# Patient Record
Sex: MALE | Race: WHITE | NOT HISPANIC OR LATINO | Employment: FULL TIME | URBAN - METROPOLITAN AREA
[De-identification: names, ages, dates, MRNs, and addresses within clinical notes are randomized per-mention and may not be internally consistent; named-entity substitution may affect disease eponyms.]

---

## 2017-03-31 ENCOUNTER — OFFICE VISIT - RIVER FALLS (OUTPATIENT)
Dept: FAMILY MEDICINE | Facility: CLINIC | Age: 52
End: 2017-03-31

## 2017-03-31 ASSESSMENT — MIFFLIN-ST. JEOR: SCORE: 2105.64

## 2017-05-11 ENCOUNTER — OFFICE VISIT - RIVER FALLS (OUTPATIENT)
Dept: FAMILY MEDICINE | Facility: CLINIC | Age: 52
End: 2017-05-11

## 2017-05-11 ENCOUNTER — COMMUNICATION - RIVER FALLS (OUTPATIENT)
Dept: FAMILY MEDICINE | Facility: CLINIC | Age: 52
End: 2017-05-11

## 2017-05-11 LAB
CREAT SERPL-MCNC: 1.22 MG/DL (ref 0.72–1.25)
GLUCOSE BLD-MCNC: 95 MG/DL (ref 65–100)

## 2018-04-03 ENCOUNTER — OFFICE VISIT - RIVER FALLS (OUTPATIENT)
Dept: FAMILY MEDICINE | Facility: CLINIC | Age: 53
End: 2018-04-03

## 2018-04-03 ASSESSMENT — MIFFLIN-ST. JEOR: SCORE: 2152.81

## 2018-04-04 LAB
CREAT SERPL-MCNC: 1.19 MG/DL (ref 0.7–1.33)
GLUCOSE BLD-MCNC: 80 MG/DL (ref 65–99)

## 2018-11-21 ENCOUNTER — OFFICE VISIT - RIVER FALLS (OUTPATIENT)
Dept: FAMILY MEDICINE | Facility: CLINIC | Age: 53
End: 2018-11-21

## 2019-04-16 ENCOUNTER — OFFICE VISIT - RIVER FALLS (OUTPATIENT)
Dept: FAMILY MEDICINE | Facility: CLINIC | Age: 54
End: 2019-04-16

## 2019-04-16 ASSESSMENT — MIFFLIN-ST. JEOR: SCORE: 2163.7

## 2019-12-12 ENCOUNTER — OFFICE VISIT - RIVER FALLS (OUTPATIENT)
Dept: FAMILY MEDICINE | Facility: CLINIC | Age: 54
End: 2019-12-12

## 2019-12-12 ASSESSMENT — MIFFLIN-ST. JEOR: SCORE: 2166.42

## 2019-12-18 ENCOUNTER — OFFICE VISIT - RIVER FALLS (OUTPATIENT)
Dept: FAMILY MEDICINE | Facility: CLINIC | Age: 54
End: 2019-12-18

## 2019-12-18 LAB
ERYTHROCYTE [DISTWIDTH] IN BLOOD BY AUTOMATED COUNT: 13.8 % (ref 11.5–15.5)
HCT VFR BLD AUTO: 40.5 % (ref 37–53)
HGB BLD-MCNC: 13.4 G/DL (ref 13.5–17.5)
MCH RBC QN AUTO: 29.5 PG (ref 26–34)
MCHC RBC AUTO-ENTMCNC: 33.1 G/DL (ref 32–36)
MCV RBC AUTO: 89 FL (ref 80–100)
PLATELET # BLD AUTO: 299 10*3/UL (ref 140–440)
PMV BLD: 10.7 FL (ref 6.5–11)
RBC # BLD AUTO: 4.54 10*6/UL (ref 4.3–5.9)
WBC # BLD AUTO: 9.7 10*3/UL (ref 4.5–11)

## 2019-12-18 ASSESSMENT — MIFFLIN-ST. JEOR: SCORE: 2166.42

## 2019-12-19 ENCOUNTER — COMMUNICATION - RIVER FALLS (OUTPATIENT)
Dept: FAMILY MEDICINE | Facility: CLINIC | Age: 54
End: 2019-12-19

## 2020-02-24 ENCOUNTER — OFFICE VISIT - RIVER FALLS (OUTPATIENT)
Dept: FAMILY MEDICINE | Facility: CLINIC | Age: 55
End: 2020-02-24

## 2020-02-24 LAB
D DIMER PPP FEU-MCNC: 0.91
D DIMER PPP FEU-MCNC: NORMAL

## 2020-02-24 ASSESSMENT — MIFFLIN-ST. JEOR: SCORE: 2184.56

## 2020-06-02 ENCOUNTER — AMBULATORY - RIVER FALLS (OUTPATIENT)
Dept: FAMILY MEDICINE | Facility: CLINIC | Age: 55
End: 2020-06-02

## 2020-06-02 ASSESSMENT — MIFFLIN-ST. JEOR: SCORE: 2155.53

## 2020-06-03 ENCOUNTER — COMMUNICATION - RIVER FALLS (OUTPATIENT)
Dept: FAMILY MEDICINE | Facility: CLINIC | Age: 55
End: 2020-06-03

## 2020-06-03 LAB
BUN SERPL-MCNC: 25 MG/DL (ref 7–25)
BUN/CREAT RATIO - HISTORICAL: 14 (ref 6–22)
CALCIUM SERPL-MCNC: 9.6 MG/DL (ref 8.6–10.3)
CHLORIDE BLD-SCNC: 110 MMOL/L (ref 98–110)
CO2 SERPL-SCNC: 25 MMOL/L (ref 20–32)
CREAT SERPL-MCNC: 1.82 MG/DL (ref 0.7–1.33)
EGFRCR SERPLBLD CKD-EPI 2021: 41 ML/MIN/1.73M2
ERYTHROCYTE [DISTWIDTH] IN BLOOD BY AUTOMATED COUNT: 13.1 % (ref 11–15)
GLUCOSE BLD-MCNC: 76 MG/DL (ref 65–99)
HCT VFR BLD AUTO: 36.8 % (ref 38.5–50)
HGB BLD-MCNC: 12.5 GM/DL (ref 13.2–17.1)
MCH RBC QN AUTO: 29.6 PG (ref 27–33)
MCHC RBC AUTO-ENTMCNC: 34 GM/DL (ref 32–36)
MCV RBC AUTO: 87 FL (ref 80–100)
PLATELET # BLD AUTO: 242 10*3/UL (ref 140–400)
PMV BLD: 10.4 FL (ref 7.5–12.5)
POTASSIUM BLD-SCNC: 4.8 MMOL/L (ref 3.5–5.3)
RBC # BLD AUTO: 4.23 10*6/UL (ref 4.2–5.8)
SODIUM SERPL-SCNC: 141 MMOL/L (ref 135–146)
WBC # BLD AUTO: 5.9 10*3/UL (ref 3.8–10.8)

## 2020-06-09 ENCOUNTER — OFFICE VISIT - RIVER FALLS (OUTPATIENT)
Dept: FAMILY MEDICINE | Facility: CLINIC | Age: 55
End: 2020-06-09

## 2020-06-23 ENCOUNTER — AMBULATORY - RIVER FALLS (OUTPATIENT)
Dept: FAMILY MEDICINE | Facility: CLINIC | Age: 55
End: 2020-06-23

## 2020-06-23 ASSESSMENT — MIFFLIN-ST. JEOR: SCORE: 2155.53

## 2020-06-24 ENCOUNTER — COMMUNICATION - RIVER FALLS (OUTPATIENT)
Dept: FAMILY MEDICINE | Facility: CLINIC | Age: 55
End: 2020-06-24

## 2020-06-24 LAB
BUN SERPL-MCNC: 25 MG/DL (ref 7–25)
BUN/CREAT RATIO - HISTORICAL: NORMAL (ref 6–22)
CALCIUM SERPL-MCNC: 9.9 MG/DL (ref 8.6–10.3)
CHLORIDE BLD-SCNC: 105 MMOL/L (ref 98–110)
CO2 SERPL-SCNC: 26 MMOL/L (ref 20–32)
CREAT SERPL-MCNC: 1.31 MG/DL (ref 0.7–1.33)
EGFRCR SERPLBLD CKD-EPI 2021: 61 ML/MIN/1.73M2
GLUCOSE BLD-MCNC: 81 MG/DL (ref 65–99)
POTASSIUM BLD-SCNC: 4.5 MMOL/L (ref 3.5–5.3)
SODIUM SERPL-SCNC: 139 MMOL/L (ref 135–146)

## 2020-10-27 ENCOUNTER — COMMUNICATION - RIVER FALLS (OUTPATIENT)
Dept: FAMILY MEDICINE | Facility: CLINIC | Age: 55
End: 2020-10-27

## 2020-10-27 ENCOUNTER — OFFICE VISIT - RIVER FALLS (OUTPATIENT)
Dept: FAMILY MEDICINE | Facility: CLINIC | Age: 55
End: 2020-10-27

## 2020-11-04 ENCOUNTER — OFFICE VISIT - RIVER FALLS (OUTPATIENT)
Dept: FAMILY MEDICINE | Facility: CLINIC | Age: 55
End: 2020-11-04

## 2020-11-04 ENCOUNTER — COMMUNICATION - RIVER FALLS (OUTPATIENT)
Dept: FAMILY MEDICINE | Facility: CLINIC | Age: 55
End: 2020-11-04

## 2020-11-04 ASSESSMENT — MIFFLIN-ST. JEOR: SCORE: 2148.28

## 2021-04-26 ENCOUNTER — OFFICE VISIT - RIVER FALLS (OUTPATIENT)
Dept: FAMILY MEDICINE | Facility: CLINIC | Age: 56
End: 2021-04-26

## 2021-04-26 LAB — URATE SERPL-MCNC: 9.2 MG/DL (ref 4–8)

## 2021-04-26 ASSESSMENT — MIFFLIN-ST. JEOR: SCORE: 2209.51

## 2021-04-28 LAB
A/G RATIO - HISTORICAL: 1.4 (ref 1–2.5)
ALBUMIN SERPL-MCNC: 4.2 GM/DL (ref 3.6–5.1)
ALP SERPL-CCNC: 73 UNIT/L (ref 35–144)
ALT SERPL W P-5'-P-CCNC: 35 UNIT/L (ref 9–46)
ANA SER QL IA: NEGATIVE
AST SERPL W P-5'-P-CCNC: 26 UNIT/L (ref 10–35)
BILIRUB DIRECT SERPL-MCNC: 0.1 MG/DL
BILIRUB INDIRECT SERPL-MCNC: 0.6 MG/DL (ref 0.2–1.2)
BILIRUB SERPL-MCNC: 0.7 MG/DL (ref 0.2–1.2)
BUN SERPL-MCNC: 16 MG/DL (ref 7–25)
BUN/CREAT RATIO - HISTORICAL: NORMAL (ref 6–22)
C REACTIVE PROTEIN LHE: 16 MG/L
CALCIUM SERPL-MCNC: 9.6 MG/DL (ref 8.6–10.3)
CHLORIDE BLD-SCNC: 104 MMOL/L (ref 98–110)
CO2 SERPL-SCNC: 25 MMOL/L (ref 20–32)
CREAT SERPL-MCNC: 1.13 MG/DL (ref 0.7–1.33)
CYCLIC CITRULLINATED PEPTIDE CCP AB IGG - QUEST: <16
EGFRCR SERPLBLD CKD-EPI 2021: 73 ML/MIN/1.73M2
ERYTHROCYTE [DISTWIDTH] IN BLOOD BY AUTOMATED COUNT: 13.3 % (ref 11–15)
ERYTHROCYTE [SEDIMENTATION RATE] IN BLOOD BY WESTERGREN METHOD: 19 MM/H
GLOBULIN: 3.1 (ref 1.9–3.7)
GLUCOSE BLD-MCNC: 90 MG/DL (ref 65–99)
HCT VFR BLD AUTO: 38.5 % (ref 38.5–50)
HGB BLD-MCNC: 13.2 GM/DL (ref 13.2–17.1)
MCH RBC QN AUTO: 29.8 PG (ref 27–33)
MCHC RBC AUTO-ENTMCNC: 34.3 GM/DL (ref 32–36)
MCV RBC AUTO: 86.9 FL (ref 80–100)
PLATELET # BLD AUTO: 245 10*3/UL (ref 140–400)
PMV BLD: 10.6 FL (ref 7.5–12.5)
POTASSIUM BLD-SCNC: 4.8 MMOL/L (ref 3.5–5.3)
PROT SERPL-MCNC: 7.3 GM/DL (ref 6.1–8.1)
RBC # BLD AUTO: 4.43 10*6/UL (ref 4.2–5.8)
RHEUMATOID FACT SER NEPH-ACNC: <14 IU/ML
SODIUM SERPL-SCNC: 138 MMOL/L (ref 135–146)
TSH SERPL DL<=0.005 MIU/L-ACNC: 1.85 MIU/L (ref 0.4–4.5)
WBC # BLD AUTO: 5.5 10*3/UL (ref 3.8–10.8)

## 2021-04-29 ENCOUNTER — COMMUNICATION - RIVER FALLS (OUTPATIENT)
Dept: FAMILY MEDICINE | Facility: CLINIC | Age: 56
End: 2021-04-29

## 2022-02-12 VITALS
TEMPERATURE: 97.3 F | WEIGHT: 289.6 LBS | DIASTOLIC BLOOD PRESSURE: 62 MMHG | HEART RATE: 80 BPM | SYSTOLIC BLOOD PRESSURE: 112 MMHG | BODY MASS INDEX: 39.83 KG/M2

## 2022-02-12 VITALS
HEART RATE: 72 BPM | RESPIRATION RATE: 14 BRPM | OXYGEN SATURATION: 97 % | TEMPERATURE: 97.3 F | BODY MASS INDEX: 40.29 KG/M2 | WEIGHT: 297.5 LBS | SYSTOLIC BLOOD PRESSURE: 122 MMHG | HEIGHT: 72 IN | DIASTOLIC BLOOD PRESSURE: 78 MMHG

## 2022-02-12 VITALS
BODY MASS INDEX: 39.01 KG/M2 | TEMPERATURE: 97.8 F | BODY MASS INDEX: 39.01 KG/M2 | SYSTOLIC BLOOD PRESSURE: 120 MMHG | WEIGHT: 288 LBS | DIASTOLIC BLOOD PRESSURE: 80 MMHG | HEIGHT: 72 IN | DIASTOLIC BLOOD PRESSURE: 70 MMHG | HEART RATE: 74 BPM | HEIGHT: 72 IN | HEART RATE: 80 BPM | SYSTOLIC BLOOD PRESSURE: 126 MMHG | OXYGEN SATURATION: 98 % | WEIGHT: 288 LBS

## 2022-02-12 VITALS
SYSTOLIC BLOOD PRESSURE: 116 MMHG | DIASTOLIC BLOOD PRESSURE: 78 MMHG | SYSTOLIC BLOOD PRESSURE: 128 MMHG | WEIGHT: 274.6 LBS | HEART RATE: 76 BPM | TEMPERATURE: 97.6 F | HEART RATE: 73 BPM | TEMPERATURE: 99 F | HEIGHT: 72 IN | BODY MASS INDEX: 37.19 KG/M2 | DIASTOLIC BLOOD PRESSURE: 85 MMHG

## 2022-02-12 VITALS
HEART RATE: 64 BPM | DIASTOLIC BLOOD PRESSURE: 68 MMHG | WEIGHT: 284 LBS | TEMPERATURE: 97.5 F | RESPIRATION RATE: 16 BRPM | BODY MASS INDEX: 39.28 KG/M2 | BODY MASS INDEX: 38.47 KG/M2 | HEIGHT: 72 IN | SYSTOLIC BLOOD PRESSURE: 118 MMHG | HEIGHT: 72 IN

## 2022-02-12 VITALS
BODY MASS INDEX: 39.55 KG/M2 | HEIGHT: 72 IN | OXYGEN SATURATION: 94 % | DIASTOLIC BLOOD PRESSURE: 70 MMHG | HEART RATE: 83 BPM | SYSTOLIC BLOOD PRESSURE: 118 MMHG | WEIGHT: 292 LBS

## 2022-02-12 VITALS
OXYGEN SATURATION: 95 % | HEIGHT: 72 IN | HEIGHT: 72 IN | WEIGHT: 285.6 LBS | HEART RATE: 74 BPM | DIASTOLIC BLOOD PRESSURE: 71 MMHG | SYSTOLIC BLOOD PRESSURE: 115 MMHG | HEIGHT: 72 IN | DIASTOLIC BLOOD PRESSURE: 70 MMHG | HEART RATE: 73 BPM | BODY MASS INDEX: 38.68 KG/M2 | OXYGEN SATURATION: 96 % | WEIGHT: 285.6 LBS | BODY MASS INDEX: 39.28 KG/M2 | SYSTOLIC BLOOD PRESSURE: 115 MMHG | BODY MASS INDEX: 38.68 KG/M2

## 2022-02-12 VITALS
BODY MASS INDEX: 38.6 KG/M2 | HEART RATE: 84 BPM | WEIGHT: 285 LBS | DIASTOLIC BLOOD PRESSURE: 84 MMHG | SYSTOLIC BLOOD PRESSURE: 136 MMHG | TEMPERATURE: 98.1 F | HEIGHT: 72 IN

## 2022-02-12 VITALS
HEART RATE: 58 BPM | TEMPERATURE: 98.3 F | DIASTOLIC BLOOD PRESSURE: 75 MMHG | SYSTOLIC BLOOD PRESSURE: 132 MMHG | BODY MASS INDEX: 38.93 KG/M2 | WEIGHT: 287.4 LBS | HEIGHT: 72 IN

## 2022-02-16 NOTE — TELEPHONE ENCOUNTER
---------------------  From: Benoit MESSER, Gabrielle ENGLAND (Phone Messages Pool ([id number]_[facility]))   To: DIONTE CUNNINGHAM    Cc: [facility] ([id number]);      Sent: 11/4/2020 11:13:37 AM CST  Subject: RE: General Message     Dr. Zahra Rodriguez in out of the clinic this week, but we do have other providers available for you to have an appointment with.    I will have someone get in touch with you about setting up an appointment.    Thank you,    Gabrielle Laboy RN      ---------------------  From: DIONTE CUNNINGHAM  To: [facility]  Sent: 11/04/2020 11:06 a.m. CST  Subject: RE: General Message  ok thanks My Main Doctor is mukund Cardozo (spelling). Would he be able to give prescription I have seen him multiple times for gout. If not could I get into to see him or call him.  ---------------------  From: Becca Hernandez LPN (Phone Messages Pool ([id number]_[facility]))  To: DIONTE CUNNINGHAM  Sent: 11/4/2020 9:35:01 AM CST  Subject: General Message       Ede Strickland is out of clinic until 11/9. The covering provider said you would need a visit for your gout pain.       Thanks,  Becca MCKAY. NLVM [email or web address]

## 2022-02-16 NOTE — TELEPHONE ENCOUNTER
US no DVT but stranding in popliteal vein.  He is clinically doing better today. Will have him elevate the legs and continue his ASA. FU if any further symptoms. Planning trip to [place] soon. Advised getting up and moving around on the flight.    KAH

## 2022-02-16 NOTE — RESULTS
-------------------------------- Original Report -------------------------------    EXAM: MRI of the RIGHT KNEE, without contrast    CLINICAL INFORMATION: Male, 54 years old, with right knee outside pain for 3  days after injury.  INDICATION: Knee pain    PRIOR SURGERY: History of meniscus repair, and sure which knee.  PLAIN FILMS: None available.  COMPARISONS: No prior MRIs available.    TECHNICAL INFORMATION: Using a 3.0T MR scanner and a localizing surface coil:    sagittals:  PD, PDFS    coronals:  PD, T2FS    axials:  PD, PDFS  SEDATION: None  CONTRAST: None    FINDINGS:    Knee joint:     Effusion: Moderate sized right knee effusion.    Popliteal cyst: None.    Loose bodies: None.    Subcutaneous and extra-articular soft tissues: Prominent appearance of edema  signal along the lateral aspect of the knee.      Ligaments:     ACL: Intact ACL anteromedial and posterolateral bundles, without sprain or  tear.    PCL: Intact PCL, without acute or chronic injury.    MCL: Intact MCL superficial and deep layers, without injury.    LCL: The proximal LCL at the femoral epicondylar attachment is thickened,  edematous, and irregular (axial series 4 image 13).  The distal attachment is  intact.  Prominent surrounding soft tissue edema.     Posterolateral corner:    Moderate posterolateral corner soft tissue edema.  The popliteus tendon is  intact though appears thickened and edematous (axial series 4 image 14).  The  muscle belly is unremarkable in appearance.  Mild intramuscular edema within the  lateral head of the gastric without evidence for architectural distortion.  The  iliotibial band is normal in thickness and signal intensity, with prominent  appearance of fluid/edema interposed between the band and the lateral femoral  epicondyle.  The biceps femoris, and popliteofibular ligament are intact.     Posteromedial corner:     No posteromedial corner soft tissue injury.  Semimembranosus, pes anserine  tendons and  posterior oblique ligament are without injury, tendinopathy or  bursitis.     Extensor mechanism:    Patellar tendon: Intact, without tendinopathy.    Quadriceps tendon: Intact, without tendinopathy.    Retinacula: Medial and lateral retinacula are intact.    Fat pads: Unremarkable infrapatellar Hoffa's, quadriceps and prefemoral fat  pads.     Medial compartment:    Medial meniscus: No articular surface, meniscosynovial junction or root tear.   No displacement, extrusion or parameniscal cyst.    Medial femoral condyle: No chondromalacia or osteochondral abnormality.    Medial tibial plateau: No chondromalacia or osteochondral abnormality.     Lateral compartment:     Lateral meniscus: Lateral meniscus is abnormal in appearance with prominently  diminutive appearance of the body and posterior horn suggestive for residual  extensive partial meniscectomy.  The posterior root and meniscofemoral ligaments  appear intact.  No evidence of lateral meniscus tearing.    Lateral femoral condyle: No chondromalacia or osteochondral abnormality.    Lateral tibial plateau: No chondromalacia or osteochondral abnormality.     Patellofemoral joint:     Patella: Grade II chondromalacia with full-thickness fissuring along the mid  and inferior central median ridge of the patella (axial series 3 image 8).    Trochlea: Grade 2 signal heterogeneity of the central trochlea (sagittal  series 6 image 16).  Grade III chondromalacia of the peripheral lateral trochlea  with focus of osseous reactive edema.     Proximal tibiofibular joint:    Unremarkable, without evidence of ligament sprain injury, joint effusion or  adjacent marrow edema.     Bones:     No stress/occult fractures or other marrow edema/pathology.    IMPRESSION:  1. Grade 1 strain injury of the proximal lateral collateral ligament at the  femoral epicondyle.  Moderate associated soft tissue edema  2. Moderate posterolateral corner soft tissue edema with mild sprain injury  of  the popliteus tendon and lateral gastrocnemius muscle belly.  No high-grade  tendon tearing or muscle architectural distortion.  3. Prominent edema/fluid signal associated with the distal iliotibial band, both  deep and superficial, suggestive for IT band syndrome versus mild IT band  sprain.    4. Findings suggestive for residua of lateral meniscectomy with prominently  diminutive appearance of the body and posterior horn.  No evidence for medial or  lateral meniscal tearing.  5. Patellofemoral chondromalacia with grade 2 and grade 3 changes as above.  6. Medial and lateral compartment articular cartilage is preserved.  7. PCL, MCL, and LCL are intact.  8. Moderate knee joint effusion.      KME      Read by: Ayaka Ramos M.D.  Reviewed and Electronically Signed by: Ayaka Ramos M.D.

## 2022-02-16 NOTE — NURSING NOTE
Comprehensive Intake Entered On:  10/27/2020 8:28 AM CDT    Performed On:  10/27/2020 8:26 AM CDT by Sissy Pierce CMA               Summary   Chief Complaint :   c/o Right Knee swollen, painful and not getting better; twisted 4 days ago; verbal consent given video visit   Menstrual Status :   N/A   Height Measured :   71.5 in(Converted to: 5 ft 11 in, 181.61 cm)    Sissy Pierce CMA - 10/27/2020 8:26 AM CDT   Health Status   Allergies Verified? :   Yes   Medication History Verified? :   Yes   Immunizations Current :   Yes   Medical History Verified? :   Yes   Sissy Pierce CMA - 10/27/2020 8:26 AM CDT   Consents   Consent for Immunization Exchange :   Consent Granted   Consent for Immunizations to Providers :   Consent Granted   Sissy Pierce CMA - 10/27/2020 8:26 AM CDT   Meds / Allergies   (As Of: 10/27/2020 8:28:52 AM CDT)   Allergies (Active)   Allegra-D 12 Hour  Estimated Onset Date:   Unspecified ; Created By:   Hanna Kothari; Reaction Status:   Active ; Category:   Drug ; Substance:   Allegra-D 12 Hour ; Type:   Allergy ; Updated By:   Hanna Kothari; Reviewed Date:   10/27/2020 8:26 AM CDT      penicillin V potassium  Estimated Onset Date:   Unspecified ; Reactions:   unknown ; Created By:   Meche Rodríguez; Reaction Status:   Active ; Category:   Drug ; Substance:   penicillin V potassium ; Type:   Allergy ; Updated By:   Meche Rodríguez; Reviewed Date:   10/27/2020 8:26 AM CDT        Medication List   (As Of: 10/27/2020 8:28:52 AM CDT)   Prescription/Discharge Order    allopurinol  :   allopurinol ; Status:   Prescribed ; Ordered As Mnemonic:   allopurinol 300 mg oral tablet ; Simple Display Line:   300 mg, 1 tab(s), Oral, daily, 90 tab(s), 3 Refill(s) ; Ordering Provider:   Lui Sweeney MD; Catalog Code:   allopurinol ; Order Dt/Tm:   6/4/2020 8:15:19 AM CDT          aspirin  :   aspirin ; Status:   Prescribed ; Ordered As Mnemonic:   aspirin 325 mg oral delayed release tablet ; Simple Display  Line:   325 mg, 1 tab(s), PO, Daily, 30 tab(s), 0 Refill(s) ; Ordering Provider:   Lui Sweeney MD; Catalog Code:   aspirin ; Order Dt/Tm:   3/31/2017 2:48:13 PM CDT          colchicine  :   colchicine ; Status:   Prescribed ; Ordered As Mnemonic:   Colcrys 0.6 mg oral tablet ; Simple Display Line:   0.6 mg, 1 tab(s), Oral, bid, for 7 day(s), 14 tab(s), 1 Refill(s) ; Ordering Provider:   Lui Sweeney MD; Catalog Code:   colchicine ; Order Dt/Tm:   6/9/2020 1:54:52 PM CDT          FLUoxetine  :   FLUoxetine ; Status:   Prescribed ; Ordered As Mnemonic:   FLUoxetine 20 mg oral capsule ; Simple Display Line:   1 cap(s), Oral, daily, 90 cap(s), 3 Refill(s) ; Ordering Provider:   Lui Sweeney MD; Catalog Code:   FLUoxetine ; Order Dt/Tm:   6/4/2020 8:15:21 AM CDT          lisinopril  :   lisinopril ; Status:   Prescribed ; Ordered As Mnemonic:   lisinopril 20 mg oral tablet ; Simple Display Line:   1 tab(s), Oral, daily, 90 tab(s), 3 Refill(s) ; Ordering Provider:   Lui Sweeney MD; Catalog Code:   lisinopril ; Order Dt/Tm:   6/4/2020 8:15:20 AM CDT          Miscellaneous Rx Supply  :   Miscellaneous Rx Supply ; Status:   Prescribed ; Ordered As Mnemonic:   Knee high compression stockings ; Simple Display Line:   2 pair, top, daily, dispense 2 Pair, 2 EA, 2 Refill(s) ; Ordering Provider:   Lui Sweeney MD; Catalog Code:   Miscellaneous Rx Supply ; Order Dt/Tm:   4/3/2018 3:42:44 PM CDT            Home Meds    Miscellaneous Rx Supply  :   Miscellaneous Rx Supply ; Status:   Documented ; Ordered As Mnemonic:   C-Pap machine @ noc. ; Catalog Code:   Miscellaneous Rx Supply ; Order Dt/Tm:   6/22/2012 11:09:38 AM CDT            ID Risk Screen   Recent Travel History :   No recent travel   Family Member Travel History :   No recent travel   Other Exposure to Infectious Disease :   Unknown   Sissy Pierce CMA - 10/27/2020 8:26 AM CDT

## 2022-02-16 NOTE — NURSING NOTE
CAGE Assessment Entered On:  4/16/2019 3:49 PM CDT    Performed On:  4/16/2019 3:49 PM CDT by Isatu Singletary CMA               Assessment   Have you ever felt you should cut down on your drinking :   No   Have people annoyed you by criticizing your drinking :   No   Have you ever felt bad or guilty about your drinking :   No   Have you ever taken a drink first thing in the morning to steady your nerves or get rid of a hangover (Eye-opener) :   No   CAGE Score :   0    Isatu Singletary CMA - 4/16/2019 3:49 PM CDT

## 2022-02-16 NOTE — PROGRESS NOTES
Patient:   DIONTE CUNNINGHAM            MRN: [id number]            FIN: [id number]               Age:   52 years     Sex:  Male     :  1965   Associated Diagnoses:   Acute gout of left foot   Author:   Ede Rowland PA-C      Report Summary   Diagnosis  Acute gout of left foot ([id number] M10.9).  Patient InstructionsOrders   Visit Information   Visit type:  General concerns.    Accompanied by:  No one.    Source of history:  Self.    Referral source:  Self.    History limitation:  None.       Chief Complaint   2018 3:10 PM CST   Left foot and toe- possible gout x 3-4 days        Interval History   Gout   The course is worsening.  Associated symptoms characterized by joint redness and joint warmth.  Left first MTP. Has flares from time to time. Ran out of Meloxicam. No injury. Needs new RX. CC above noted and confirmed with the patient.Needs new order for colonoscopy..        Review of Systems   Constitutional:  Negative.    Musculoskeletal:  Negative except as documented in history of present illness.    Integumentary:  Negative except as documented in history of present illness.       Health Status   Allergies:    Allergic Reactions (All)  Severity Not Documented  Allegra-D 12 Hour (No reactions were documented)  Penicillin V potassium (Unknown)   Medications:  (Selected)   Prescriptions  Prescribed  FLUoxetine 20 mg oral capsule: 1 cap(s) ( 20 mg ), PO, Daily, # 90 cap(s), 3 Refill(s), Type: Maintenance, Pharmacy: [facility] #[id number], 1 cap(s) po daily  Knee high compression stockings: Knee high compression stockings, 2 pair, top, daily, Instructions: dispense 2 Pair, Supply, # 2 EA, 2 Refill(s), Type: Maintenance  aspirin 325 mg oral delayed release tablet: 1 tab(s) ( 325 mg ), PO, Daily, # 30 tab(s), 0 Refill(s), Type: Maintenance, OTC (Rx)  lisinopril 20 mg oral tablet: 1 tab(s) ( 20 mg ), PO, Daily, # 90 tab(s), 3 Refill(s), Type: Maintenance, Pharmacy: [facility] #[id number], 1 tab(s) po  daily  meloxicam 15 mg oral tablet: = 1 tab(s) ( 15 mg ), PO, Daily, # 90 tab(s), 0 Refill(s), Type: Maintenance, Pharmacy: [facility] #[id number], 1 tab(s) Oral daily  predniSONE 20 mg oral tablet: = 1 tab(s) ( 20 mg ), PO, Daily, # 7 tab(s), 0 Refill(s), Type: Maintenance, Pharmacy: [facility] #[id number], 1 tab(s) Oral daily,x7 day(s)  Documented Medications  Documented  C-Pap machine @ noc.: C-Pap machine @ noc., 0 Refill(s), Type: Soft Stop   Problem list:    All Problems (Selected)  LISBETH (Obstructive Sleep Apnea) / ICD-9-.23 / Confirmed  Obese / ICD-9-.00 / Probable  Hypertension / SNOMED CT 6705031579 / Confirmed  History of DVT in adulthood / SNOMED CT 9627349132 / Confirmed  Family history of kidney stone / SNOMED CT 0424437811 / Confirmed  Depression, Recurrent / SNOMED CT 706409363 / Confirmed      Histories   Past Medical History:    Active  Depression, Recurrent (744562686)  Obese (278.00)  Family history of kidney stone (8030217727)   Family History:    CA - Lung cancer  Mother  CVA - Cerebrovascular accident  Mother     Procedure history:    Bone spur ([id number]) on 8/8/2016 at 50 Years.  Comments:  8/16/2016 9:28 AM - Sheridan Ignacio  Left hallux.  Vasectomy (39042677) on 12/16/2005 at 39 Years.  Ventricular septal defect, repaired ([id number]).      Physical Examination   Vital Signs   11/21/2018 3:10 PM CST Temperature Tympanic 97.3 DegF  LOW    Peripheral Pulse Rate 80 bpm    Pulse Site Radial artery    HR Method Manual    Systolic Blood Pressure 112 mmHg    Diastolic Blood Pressure 62 mmHg    Mean Arterial Pressure 79 mmHg    BP Site Left arm    BP Method Manual      Measurements from flowsheet : Measurements   11/21/2018 3:10 PM CST   Weight Measured - Standard                289.6 lb     Respiratory:  Respirations are non-labored.    Cardiovascular:  Normal rate.    Musculoskeletal:  Left first MTP laterally is swollen, erythematous and warm to touch. Pedal  pulse intact. Skin intact..       Impression and Plan   Diagnosis     Acute gout of left foot ([id number] M10.9).     Patient Instructions:       Counseled: Patient, Regarding diagnosis, Activity, Verbalized understanding.    Orders     Orders (Selected)   Prescriptions  Prescribed  meloxicam 15 mg oral tablet: = 1 tab(s) ( 15 mg ), PO, Daily, # 90 tab(s), 0 Refill(s), Type: Maintenance, Pharmacy: [facility] #[id number], 1 tab(s) Oral daily  predniSONE 20 mg oral tablet: = 1 tab(s) ( 20 mg ), PO, Daily, # 7 tab(s), 0 Refill(s), Type: Maintenance, Pharmacy: [facility] #[id number], 1 tab(s) Oral daily,x7 day(s).     Sent order for colonoscopy. RONNY PRN.

## 2022-02-16 NOTE — TELEPHONE ENCOUNTER
Entered by Shayla Jamison CMA on April 29, 2020 10:11:25 AM CDT  ---------------------  From: Shayla Jamison CMA   To: [employer] [facility] #[id number]    Sent: 4/29/2020 10:11:25 AM CDT  Subject: Medication Management     ** Submitted: **  Order:lisinopril (lisinopril 20 mg oral tablet)  1 tab(s)  Oral  daily  Qty:  30 tab(s)        Refills:  0          Substitutions Allowed     Route To [facility] #[id number]    Signed by Shayla Jamison CMA  4/29/2020 3:10:00 PM    ** Submitted: **  Complete:lisinopril (lisinopril 20 mg oral tablet)   Signed by Shayla Jamison CMA  4/29/2020 3:11:00 PM    ** Not Approved:  **  lisinopril (LISINOPRIL 20MG TABLETS)  TAKE 1 TABLET BY MOUTH DAILY  Qty:  90 tab(s)        Days Supply:  30        Refills:  0          Substitutions Allowed     Route To [facility] #[id number]   Signed by Shayla Jamison CMA            ** Submitted: **  Order:FLUoxetine (FLUoxetine 20 mg oral capsule)  1 cap(s)  Oral  daily  Qty:  30 cap(s)        Refills:  0          Substitutions Allowed     Route To [facility] #[id number]    Signed by Shayla Jamison CMA  4/29/2020 3:10:00 PM    ** Submitted: **  Complete:FLUoxetine (FLUoxetine 20 mg oral capsule)   Signed by Shayla Jamison CMA  4/29/2020 3:10:00 PM    ** Not Approved:  **  FLUoxetine (FLUOXETINE 20MG CAPSULES)  TAKE ONE CAPSULE BY MOUTH DAILY  Qty:  90 cap(s)        Days Supply:  30        Refills:  0          Substitutions Allowed     Route To [facility] #[id number]   Signed by Shayla Jamison CMA            ------------------------------------------  [id number]  To: Lui Sweeney MD  Sent: April 29, 2020 7:23:09 AM CDT  Subject: Medication Management  Due: April 30, 2020 7:23:09 AM CDT    ** On Hold Pending Signature **  Drug: lisinopril (lisinopril 20 mg oral tablet)  TAKE 1 TABLET BY MOUTH DAILY  Quantity: 90 tab(s)  Days Supply: 30  Refills: 0  Substitutions  Allowed  Notes from Pharmacy:     Dispensed Drug: lisinopril (lisinopril 20 mg oral tablet)  TAKE 1 TABLET BY MOUTH DAILY  Quantity: 90 tab(s)  Days Supply: 30  Refills: 0  Substitutions Allowed  Notes from Pharmacy:     ** On Hold Pending Signature **  Drug: FLUoxetine (FLUoxetine 20 mg oral capsule)  TAKE ONE CAPSULE BY MOUTH DAILY  Quantity: 90 cap(s)  Days Supply: 30  Refills: 0  Substitutions Allowed  Notes from Pharmacy:     Dispensed Drug: FLUoxetine (FLUoxetine 20 mg oral capsule)  TAKE ONE CAPSULE BY MOUTH DAILY  Quantity: 90 cap(s)  Days Supply: 30  Refills: 0  Substitutions Allowed  Notes from Pharmacy:   ------------------------------------------Called and left a detailed message, patient is due for labs and med check with [facility], refilled for 30 days per protocol.   Shayla DA SILVA CMA

## 2022-02-16 NOTE — PROGRESS NOTES
Chief Complaint    Allergy since last thursday. Taken Benydrl and Claritin.  History of Present Illness       Patient here because of a rash on his cheeks forehead and around his eyes.       Is been there for 4 days now.  He thinks today is better than yesterday.  Does feel warm flushed.  Says even gets enough heat wears glasses of stand up.  It is uncomfortable but not really painful.  He has taken some Claritin and Benadryl over the weekend that is possibly helping but only a small amount.  There is no rash anywhere else in his body.  He did have a recent right great toe gout flare that has not resolved but is much better.  Otherwise his body feels good.  Uses CPAP and has not used any different mask or had any different washes to his CPAP mask.  He has had depression does request that I refill his fluoxetine says it works very well.       He is used lisinopril for his hypertension.  He has had colchicine available for gout flares.  Been recommended he try allopurinol but he did never filled that medication  Review of Systems       Insert follow-up review of symptoms  Physical Exam   Vitals & Measurements    T: 97.3  F (Tympanic)  HR: 72 (Peripheral)  RR: 14  BP: 122/78  SpO2: 97%     HT: 71.5 in  WT: 297.5 lb  BMI: 40.91        White sclera       Malar rash present he does have photos that he will keep       Oropharynx without any lesions       Supple neck no adenopathy       Lungs are clear to auscultation       No joint swelling in the hands elbows       Abdomen is not distended  Assessment/Plan       1. LISBETH (Obstructive Sleep Apnea) (G47.33)          We will continue to use his CPAP and ensure the mask is clean as it sits on his face         Ordered:          Uric Acid* ([facility]), Specimen Type: Serum, Collection Date: 04/26/21 11:08:00 CDT                2. Gout (M10.9)          Reduce prednisone which will help his gout but also possibly the rash.  I have ordered a uric acid he is still wanting to periodically  treat his gout not on an everyday basis         Ordered:          predniSONE, See Instructions, Instructions: 2 tab(s) PO Daily 7 day(s) then 1 tab po daily for next 7 days, # 21 tab(s), 0 Refill(s), Type: Maintenance, Pharmacy: [facility] #[id number], 2 tab(s) PO Daily 7 day(s) then 1 tab po daily for next 7 days, 71.5, i..., (Ordered)          JACQUIE Screen, IFA, with Reflex to Titer and Pattern* ([facility]), Specimen Type: Serum, Collection Date: 04/26/21 11:05:00 CDT          Basic Metabolic Panel* ([facility]), Specimen Type: Serum, Collection Date: 04/26/21 11:05:00 CDT          C-Reactive Protein* ([facility]), Specimen Type: Serum, Collection Date: 04/26/21 11:05:00 CDT          CBC (h/h, RBC, indices, WBC, Plt)* ([facility]), Specimen Type: Blood, Collection Date: 04/26/21 11:05:00 CDT          Cyclic citrullinated peptide (ccp) ab (igg)* ([facility]), Specimen Type: Serum, Collection Date: 04/26/21 11:05:00 CDT          Hepatic Function Panel* ([facility]), Specimen Type: Serum, Collection Date: 04/26/21 11:05:00 CDT          Rheumatoid factor* ([facility]), Specimen Type: Serum, Collection Date: 04/26/21 11:05:00 CDT          Sed rate by modified westergren* ([facility]), Specimen Type: Blood, Collection Date: 04/26/21 11:05:00 CDT          TSH* ([facility]), Specimen Type: Serum, Collection Date: 04/26/21 11:05:00 CDT          Uric Acid* ([facility]), Specimen Type: Serum, Collection Date: 04/26/21 11:08:00 CDT                3. Rash (R21)          He has a malar rash but not a lot of other symptoms.  I have ordered some general lab data creatinine inflammatory component in his system and he will follow-up in 1 to 2 weeks         Ordered:          predniSONE, See Instructions, Instructions: 2 tab(s) PO Daily 7 day(s) then 1 tab po daily for next 7 days, # 21 tab(s), 0 Refill(s), Type: Maintenance, Pharmacy: [facility] #[id number], 2 tab(s) PO Daily 7 day(s) then 1 tab po daily for next 7 days, 71.5, i..., (Ordered)          JACQUIE Screen, IFA, with  Reflex to Titer and Pattern* ([facility]), Specimen Type: Serum, Collection Date: 04/26/21 11:05:00 CDT          Basic Metabolic Panel* ([facility]), Specimen Type: Serum, Collection Date: 04/26/21 11:05:00 CDT          C-Reactive Protein* ([facility]), Specimen Type: Serum, Collection Date: 04/26/21 11:05:00 CDT          CBC (h/h, RBC, indices, WBC, Plt)* ([facility]), Specimen Type: Blood, Collection Date: 04/26/21 11:05:00 CDT          Cyclic citrullinated peptide (ccp) ab (igg)* ([facility]), Specimen Type: Serum, Collection Date: 04/26/21 11:05:00 CDT          Hepatic Function Panel* ([facility]), Specimen Type: Serum, Collection Date: 04/26/21 11:05:00 CDT          Rheumatoid factor* ([facility]), Specimen Type: Serum, Collection Date: 04/26/21 11:05:00 CDT          Sed rate by modified westergren* ([facility]), Specimen Type: Blood, Collection Date: 04/26/21 11:05:00 CDT          TSH* ([facility]), Specimen Type: Serum, Collection Date: 04/26/21 11:05:00 CDT          Uric Acid* ([facility]), Specimen Type: Serum, Collection Date: 04/26/21 11:08:00 CDT                Orders:         colchicine, = 1 tab(s) ( 0.6 mg ), Oral, bid, # 14 tab(s), 0 Refill(s), Type: Maintenance, Pharmacy: [facility] #[id number], Patient will call., 1 tab(s) Oral bid,x7 day(s), 71.5, in, 04/26/21 10:40:00 CDT, Height Measured, 297.5, lb, 04/26/21 10:40:00 CDT,..., (Ordered)         colchicine, = 1 tab(s) ( 0.6 mg ), Oral, bid, x 7 day(s), # 14 tab(s), 0 Refill(s), Type: Hard Stop, Pharmacy: [facility] #[id number], Patient will call., 71.5, in, 11/04/20 15:28:00 CST, Height Measured, 284, lb, 11/04/20 15:28:00 CST, Weight Measured, (Completed)         FLUoxetine, = 1 cap(s), Oral, daily, # 90 cap(s), 3 Refill(s), Type: Hard Stop, Pharmacy: [facility] #[id number], Due for labs and appt prior to next refill, 71.5, in, 06/02/20 15:11:00 CDT, Height Measured, 285.6, lb, 06/02/20 15:11:00 CDT, Weight Measured, (Completed)         FLUoxetine, = 1 cap(s), Oral, daily, # 90  cap(s), 3 Refill(s), Type: Maintenance, Pharmacy: [facility] #[id number], Due for labs and appt prior to next refill, 1 cap(s) Oral daily, 71.5, in, 04/26/21 10:40:00 CDT, Height Measured, 297.5, lb, 04/26/21 10:40:0..., (Ordered)         lisinopril, = 1 tab(s), Oral, daily, # 90 tab(s), 3 Refill(s), Type: Hard Stop, Pharmacy: [facility] #[id number], Due for labs and appt prior to next refill, 71.5, in, 06/02/20 15:11:00 CDT, Height Measured, 285.6, lb, 06/02/20 15:11:00 CDT, Weight Measured, (Completed)         lisinopril, = 1 tab(s), Oral, daily, # 90 tab(s), 3 Refill(s), Type: Maintenance, Pharmacy: [facility] #[id number], Due for labs and appt prior to next refill, 1 tab(s) Oral daily, 71.5, in, 04/26/21 10:40:00 CDT, Height Measured, 297.5, lb, 04/26/21 10:40:0..., (Ordered)  Patient Information     Name:DIONTE CUNNINGHAM      Address:      [address] [place] [address] [place] [id number]     Sex:Male     YOB: 1965     [phone]     Emergency Contact:SAY CUNNINGHAM     MRN:[id number]     FIN:[id number]     Location:[facility]     Date of Service:04/26/2021      Primary Care Physician:       Lui Sweeney MD, [phone]      Attending Physician:       Ty Hill MD, [phone]  Problem List/Past Medical History    Ongoing     Depression, Recurrent     Family history of kidney stone     Gout     History of DVT in adulthood     History of vertigo     Hypertension     Obese     LISBETH (Obstructive Sleep Apnea)    Historical     No qualifying data  Procedure/Surgical History     Bone spur (08/08/2016)      Comments: Left hallux..     Vasectomy (12/16/2005)     Ventricular septal defect, repaired  Medications    allopurinol 300 mg oral tablet, 300 mg= 1 tab(s), Oral, daily, 3 refills    aspirin 325 mg oral delayed release tablet, 325 mg= 1 tab(s), Oral, daily    Benadryl 25 mg oral capsule, 25 mg= 1 cap(s), Oral, As Directed, PRN    [facility]-Pap machine @ [facility].     Claritin 10 mg oral tablet, 10 mg= 1 tab(s), Oral, daily    Colcrys 0.6 mg oral tablet, 0.6 mg= 1 tab(s), Oral, bid    FLUoxetine 20 mg oral capsule, 1 cap(s), Oral, daily, 3 refills    Knee high compression stockings, 2 pair, Topical, daily, 2 refills    lisinopril 20 mg oral tablet, 1 tab(s), Oral, daily, 3 refills    predniSONE 10 mg oral tablet, See Instructions  Allergies    Allegra-D 12 Hour    penicillin V potassium (unknown)  Social History    Smoking Status     Never smoker     Alcohol - Current      1-2 per wk     Electronic Cigarette/Vaping      Electronic Cigarette Use: Never.     Exercise - Regular exercise     Home/Environment      Marital status: . Spouse/Partner name: Flores.     Substance Abuse - Denies Substance Abuse      Never     Tobacco - Denies Tobacco Use      Never (less than 100 in lifetime)  Family History    CA - Lung cancer: Mother.    CVA - Cerebrovascular accident: Mother.  Immunizations      Vaccine Date Status          SARS-CoV-2 (COVID-19) Truevision Ad26 vacc 04/08/2021 Recorded          tetanus/diphth/pertuss (Tdap) adult/adol 04/16/2019 Given          tetanus/diphth/pertuss (Tdap) adult/adol 03/30/2005 Recorded  Lab Results          Lab Results (Last 4 results within 90 days)           Uric Acid: 9.2 mg/dL High [4 mg/dL - 8 mg/dL] (04/26/21 11:34:00)

## 2022-02-16 NOTE — NURSING NOTE
Comprehensive Intake Entered On:  2/24/2020 2:57 PM CST    Performed On:  2/24/2020 2:54 PM CST by Shayla Jamison CMA               Summary   Chief Complaint :   possible gout left foot x1 week    Menstrual Status :   N/A   Weight Measured :   292.0 lb(Converted to: 292 lb 0 oz, 132.45 kg)    Height Measured :   71.5 in(Converted to: 5 ft 11 in, 181.61 cm)    Body Mass Index :   40.15 kg/m2 (HI)    Body Surface Area :   2.58 m2   Systolic Blood Pressure :   118 mmHg   Diastolic Blood Pressure :   70 mmHg   Mean Arterial Pressure :   86 mmHg   Peripheral Pulse Rate :   83 bpm   BP Method :   Manual   HR Method :   Electronic   Oxygen Saturation :   94 %   Shayla Jamison CMA - 2/24/2020 2:54 PM CST   Health Status   Allergies Verified? :   Yes   Medication History Verified? :   Yes   Immunizations Current :   Yes   Medical History Verified? :   Yes   Pre-Visit Planning Status :   Completed   Tobacco Use? :   Never smoker   Shayla Jamison CMA - 2/24/2020 2:54 PM CST   Consents   Consent for Immunization Exchange :   Consent Granted   Consent for Immunizations to Providers :   Consent Granted   Shayla Jamison CMA - 2/24/2020 2:54 PM CST   Meds / Allergies   (As Of: 2/24/2020 2:57:50 PM CST)   Allergies (Active)   Allegra-D 12 Hour  Estimated Onset Date:   Unspecified ; Created By:   Hanna Kothari; Reaction Status:   Active ; Category:   Drug ; Substance:   Allegra-D 12 Hour ; Type:   Allergy ; Updated By:   Hanna Kothari; Reviewed Date:   2/24/2020 2:54 PM CST      penicillin V potassium  Estimated Onset Date:   Unspecified ; Reactions:   unknown ; Created By:   Meche Rodríguez; Reaction Status:   Active ; Category:   Drug ; Substance:   penicillin V potassium ; Type:   Allergy ; Updated By:   Meche Rodríguez; Reviewed Date:   2/24/2020 2:54 PM CST        Medication List   (As Of: 2/24/2020 2:57:50 PM CST)   Prescription/Discharge Order    allopurinol  :   allopurinol ; Status:   Prescribed ; Ordered As Mnemonic:    allopurinol 300 mg oral tablet ; Simple Display Line:   300 mg, 1 tab(s), Oral, daily, 90 tab(s), 3 Refill(s) ; Ordering Provider:   Ede Rowland PA-C; Catalog Code:   allopurinol ; Order Dt/Tm:   12/12/2019 9:47:35 AM CST          aspirin  :   aspirin ; Status:   Prescribed ; Ordered As Mnemonic:   aspirin 325 mg oral delayed release tablet ; Simple Display Line:   325 mg, 1 tab(s), PO, Daily, 30 tab(s), 0 Refill(s) ; Ordering Provider:   Lui Sweeney MD; Catalog Code:   aspirin ; Order Dt/Tm:   3/31/2017 2:48:13 PM CDT          colchicine  :   colchicine ; Status:   Prescribed ; Ordered As Mnemonic:   Colcrys 0.6 mg oral tablet ; Simple Display Line:   0.6 mg, 1 tab(s), Oral, bid, for 7 day(s), 14 tab(s), 0 Refill(s) ; Ordering Provider:   Ede Rowland PA-C; Catalog Code:   colchicine ; Order Dt/Tm:   12/18/2019 3:00:17 PM CST          FLUoxetine  :   FLUoxetine ; Status:   Prescribed ; Ordered As Mnemonic:   FLUoxetine 20 mg oral capsule ; Simple Display Line:   20 mg, 1 cap(s), PO, Daily, 90 cap(s), 3 Refill(s) ; Ordering Provider:   Lui Sweeney MD; Catalog Code:   FLUoxetine ; Order Dt/Tm:   4/16/2019 3:31:39 PM CDT          lisinopril  :   lisinopril ; Status:   Prescribed ; Ordered As Mnemonic:   lisinopril 20 mg oral tablet ; Simple Display Line:   20 mg, 1 tab(s), PO, Daily, 90 tab(s), 3 Refill(s) ; Ordering Provider:   Lui Sweeney MD; Catalog Code:   lisinopril ; Order Dt/Tm:   4/16/2019 3:31:40 PM CDT          meloxicam  :   meloxicam ; Status:   Prescribed ; Ordered As Mnemonic:   meloxicam 15 mg oral tablet ; Simple Display Line:   15 mg, 1 tab(s), PO, Daily, 90 tab(s), 0 Refill(s) ; Ordering Provider:   Ede Rowland PA-C; Catalog Code:   meloxicam ; Order Dt/Tm:   12/12/2019 9:47:06 AM CST          Miscellaneous Rx Supply  :   Miscellaneous Rx Supply ; Status:   Prescribed ; Ordered As Mnemonic:   Knee high compression stockings ; Simple Display Line:   2 pair, top,  daily, dispense 2 Pair, 2 EA, 2 Refill(s) ; Ordering Provider:   Lui Sweeney MD; Catalog Code:   Miscellaneous Rx Supply ; Order Dt/Tm:   4/3/2018 3:42:44 PM CDT            Home Meds    Miscellaneous Rx Supply  :   Miscellaneous Rx Supply ; Status:   Documented ; Ordered As Mnemonic:   C-Pap machine @ [facility] ; Catalog Code:   Miscellaneous Rx Supply ; Order Dt/Tm:   6/22/2012 11:09:38 AM CDT

## 2022-02-16 NOTE — NURSING NOTE
Comprehensive Intake Entered On:  11/4/2020 3:33 PM CST    Performed On:  11/4/2020 3:28 PM CST by Ji New CMA               Summary   Chief Complaint :   Pt here for painful,red and swollen Right foot x 7 days.   Menstrual Status :   N/A   Weight Measured :   284 lb(Converted to: 284 lb 0 oz, 128.820 kg)    Height Measured :   71.5 in(Converted to: 5 ft 11 in, 181.61 cm)    Body Mass Index :   39.05 kg/m2 (HI)    Body Surface Area :   2.55 m2   Systolic Blood Pressure :   118 mmHg   Diastolic Blood Pressure :   68 mmHg   Mean Arterial Pressure :   85 mmHg   Peripheral Pulse Rate :   64 bpm   BP Site :   Right arm   Pulse Site :   Radial artery   Temperature Tympanic :   97.5 DegF(Converted to: 36.4 DegC)  (LOW)    Respiratory Rate :   16 br/min   Ji New CMA - 11/4/2020 3:28 PM CST   Health Status   Allergies Verified? :   Yes   Medication History Verified? :   Yes   Immunizations Current :   Yes   Medical History Verified? :   Yes   Pre-Visit Planning Status :   Not completed   Tobacco Use? :   Never smoker   Ji New CMA - 11/4/2020 3:28 PM CST   Meds / Allergies   (As Of: 11/4/2020 3:33:37 PM CST)   Allergies (Active)   Allegra-D 12 Hour  Estimated Onset Date:   Unspecified ; Created By:   Hanna Kothari; Reaction Status:   Active ; Category:   Drug ; Substance:   Allegra-D 12 Hour ; Type:   Allergy ; Updated By:   Hanna Kothari; Reviewed Date:   10/27/2020 8:26 AM CDT      penicillin V potassium  Estimated Onset Date:   Unspecified ; Reactions:   unknown ; Created By:   Meche Rodríguez; Reaction Status:   Active ; Category:   Drug ; Substance:   penicillin V potassium ; Type:   Allergy ; Updated By:   Meche Rodríguez; Reviewed Date:   10/27/2020 8:26 AM CDT        Medication List   (As Of: 11/4/2020 3:33:37 PM CST)   Prescription/Discharge Order    colchicine  :   colchicine ; Status:   Processing ; Ordered As Mnemonic:   Colcrys 0.6 mg oral tablet ; Ordering Provider:   Zahra ENRIQUEZ,  Lui; Action Display:   Complete ; Catalog Code:   colchicine ; Order Dt/Tm:   11/4/2020 3:31:37 PM CST          allopurinol  :   allopurinol ; Status:   Prescribed ; Ordered As Mnemonic:   allopurinol 300 mg oral tablet ; Simple Display Line:   300 mg, 1 tab(s), Oral, daily, 90 tab(s), 3 Refill(s) ; Ordering Provider:   Lui Sweeney MD; Catalog Code:   allopurinol ; Order Dt/Tm:   6/4/2020 8:15:19 AM CDT          FLUoxetine  :   FLUoxetine ; Status:   Prescribed ; Ordered As Mnemonic:   FLUoxetine 20 mg oral capsule ; Simple Display Line:   1 cap(s), Oral, daily, 90 cap(s), 3 Refill(s) ; Ordering Provider:   Lui Sweeney MD; Catalog Code:   FLUoxetine ; Order Dt/Tm:   6/4/2020 8:15:21 AM CDT          lisinopril  :   lisinopril ; Status:   Prescribed ; Ordered As Mnemonic:   lisinopril 20 mg oral tablet ; Simple Display Line:   1 tab(s), Oral, daily, 90 tab(s), 3 Refill(s) ; Ordering Provider:   Lui Sweeney MD; Catalog Code:   lisinopril ; Order Dt/Tm:   6/4/2020 8:15:20 AM CDT          Miscellaneous Rx Supply  :   Miscellaneous Rx Supply ; Status:   Prescribed ; Ordered As Mnemonic:   Knee high compression stockings ; Simple Display Line:   2 pair, top, daily, dispense 2 Pair, 2 EA, 2 Refill(s) ; Ordering Provider:   Lui Sweeney MD; Catalog Code:   Miscellaneous Rx Supply ; Order Dt/Tm:   4/3/2018 3:42:44 PM CDT          aspirin  :   aspirin ; Status:   Prescribed ; Ordered As Mnemonic:   aspirin 325 mg oral delayed release tablet ; Simple Display Line:   325 mg, 1 tab(s), PO, Daily, 30 tab(s), 0 Refill(s) ; Ordering Provider:   Lui Sweeney MD; Catalog Code:   aspirin ; Order Dt/Tm:   3/31/2017 2:48:13 PM CDT            Home Meds    Miscellaneous Rx Supply  :   Miscellaneous Rx Supply ; Status:   Documented ; Ordered As Mnemonic:   C-Pap machine @ [facility] ; Catalog Code:   Miscellaneous Rx Supply ; Order Dt/Tm:   6/22/2012 11:09:38 AM CDT            ID Risk Screen    Recent Travel History :   No recent travel   Family Member Travel History :   No recent travel   Other Exposure to Infectious Disease :   Unknown   Ji New CMA - 11/4/2020 3:28 PM CST   Social History   Social History   (As Of: 11/4/2020 3:33:37 PM CST)   Alcohol:  Current      1-2 per wk   (Last Updated: 8/30/2010 10:37:03 AM CDT by Sheridan Ignacio)          Tobacco:  Denies Tobacco Use      Never   (Last Updated: 10/26/2011 8:27:50 AM CDT by Sheridan Ignacio)          Substance Abuse:  Denies Substance Abuse      Never   (Last Updated: 10/26/2011 8:27:53 AM CDT by Sheridan Ignacio)          Home/Environment:        Marital status: .  Spouse/Partner name: Flores.   (Last Updated: 10/26/2011 8:27:29 AM CDT by Sheridan Ignacio)          Exercise:  Regular exercise      (Last Updated: 8/30/2010 10:39:51 AM CDT by Sheridan Ignacio )

## 2022-02-16 NOTE — PROGRESS NOTES
Patient:   DIONTE CUNNINGHAM            MRN: [id number]            FIN: [id number]               Age:   54 years     Sex:  Male     :  1965   Associated Diagnoses:   Strain of right knee   Author:   Ede Rowland PA-C      Report Summary   Diagnosis  Strain of right knee (CDG68-BS S86.911A).  Patient InstructionsOrders   Visit Information      Date of Service: 10/27/2020 08:06 am  Performing Location: [facility]  Encounter#: [id number]      Primary Care Provider (PCP):  Lui Sweeney MD    NPI# [id number]      Referring Provider:  Ede Rowland PA-C    NPI# [id number]   Visit type:  Video Visit via [place] or [employer].    Participants in room during visit: 2   Location of patient:  Home in [place]  Location of provider:  _ (Clinic office )  Video Start Time:  828  Video End Time:   832    Today's visit was conducted via video conference due to the COVID-19 pandemic.  The patient's consent to proceed with a video visit has been obtained and documented.      Chief Complaint   Right knee pain      History of Present Illness   Patient is a 54 year old M who is being evaluated via a billable video visit. Twisted right knee 3 days ago. Painful laterally. Swollen. Not erythematous or warm to touch.       Review of Systems   Constitutional:  Negative.    Eye:  Negative.    Ear/Nose/Mouth/Throat:  Negative.    Respiratory:  Negative.    Cardiovascular:  Negative.    Gastrointestinal:  Negative.    Genitourinary:  Negative.    Immunologic:  Negative.    Musculoskeletal:  Negative except as documented in history of present illness.    Integumentary:  Negative.    Neurologic:  Negative.    Psychiatric:  Negative.       Health Status   Allergies:    Allergic Reactions (Selected)  Severity Not Documented  Allegra-D 12 Hour (No reactions were documented)  Penicillin V potassium (Unknown)   Medications:  (Selected)   Prescriptions  Prescribed  Colcrys 0.6 mg oral tablet: = 1 tab(s) ( 0.6 mg ), Oral,  bid, # 14 tab(s), 1 Refill(s), Type: Maintenance, Pharmacy: [facility] #[id number], Patient will call., 1 tab(s) Oral bid,x7 day(s), 71.5, in, 06/09/20 13:34:00 CDT, Height Measured, 285.6, lb, 06/02/20 15:11:00 CDT,...  FLUoxetine 20 mg oral capsule: = 1 cap(s), Oral, daily, # 90 cap(s), 3 Refill(s), Type: Maintenance, Pharmacy: [facility] #[id number], Due for labs and appt prior to next refill, 1 cap(s) Oral daily, 71.5, in, 06/02/20 15:11:00 CDT, Height Measured, 285.6, lb, 06/02/20 15:11:0...  Knee high compression stockings: Knee high compression stockings, 2 pair, top, daily, Instructions: dispense 2 Pair, Supply, # 2 EA, 2 Refill(s), Type: Maintenance  allopurinol 300 mg oral tablet: = 1 tab(s) ( 300 mg ), Oral, daily, # 90 tab(s), 3 Refill(s), Type: Maintenance, Pharmacy: [facility] #[id number], 1 tab(s) Oral daily, 71.5, in, 06/02/20 15:11:00 CDT, Height Measured, 285.6, lb, 06/02/20 15:11:00 CDT, Weight Measured  aspirin 325 mg oral delayed release tablet: 1 tab(s) ( 325 mg ), PO, Daily, # 30 tab(s), 0 Refill(s), Type: Maintenance, OTC (Rx)  lisinopril 20 mg oral tablet: = 1 tab(s), Oral, daily, # 90 tab(s), 3 Refill(s), Type: Maintenance, Pharmacy: [facility] #[id number], Due for labs and appt prior to next refill, 1 tab(s) Oral daily, 71.5, in, 06/02/20 15:11:00 CDT, Height Measured, 285.6, lb, 06/02/20 15:11:0...  Documented Medications  Documented  C-Pap machine @ noc.: C-Pap machine @ noc., 0 Refill(s), Type: Soft Stop   Problem list:    All Problems  LISBETH (Obstructive Sleep Apnea) / ICD-9-.23 / Confirmed  Obese / ICD-9-.00 / Probable  Hypertension / SNOMED CT 8139550710 / Confirmed  History of DVT in adulthood / SNOMED CT 7100694988 / Confirmed  Family history of kidney stone / SNOMED CT 5608308017 / Confirmed  Depression, Recurrent / SNOMED CT 100028617 / Confirmed      Histories   Past Medical History:    Active  Depression, Recurrent (602979767)  Obese  (278.00)  Family history of kidney stone (6234580703)   Family History:    CA - Lung cancer  Mother  CVA - Cerebrovascular accident  Mother     Procedure history:    Bone spur ([id number]) on 8/8/2016 at 50 Years.  Comments:  8/16/2016 9:28 AM CDT - Sheridan Ignacio  Left hallux.  Vasectomy (59820809) on 12/16/2005 at 39 Years.  Ventricular septal defect, repaired (415811956).   Social History:        Alcohol Assessment: Current            1-2 per wk      Tobacco Assessment: Denies Tobacco Use            Never      Substance Abuse Assessment: Denies Substance Abuse            Never      Home and Environment Assessment            Marital status: .  Spouse/Partner name: Flores.      Exercise and Physical Activity Assessment: Regular exercise        Physical Examination   General:  Alert and oriented, No acute distress.    Eye:  Pupils are equal, round and reactive to light, Normal conjunctiva.    HENT:  Oral mucosa is moist.    Neck:  Supple.    Respiratory:  Respirations are non-labored.    Psychiatric:  Cooperative, Appropriate mood & affect, Normal judgment.       Impression and Plan   Diagnosis     Strain of right knee (KWQ72-SW S86.911A).     Patient Instructions:       Counseled: Patient, Regarding treatment, Regarding medications, Activity.    Orders     Orders   Requests (Radiology):  MRI Knee w/o Contrast Right (Request) (Order): Priority: Routine, Strain of right knee.     Ice, elevate. FU after MRI      Health Maintenance      Recommendations     Pending (in the next year)        OverDue           Lipid Disorders Screen (Male) due  08/04/16  and every 1  year(s)           Alcohol Misuse Screen (Male) due  04/16/20  and every 1  year(s)           Depression Screen (Male) due  04/16/20  and every 1  year(s)           Influenza Vaccine due  08/31/20  and every 1  year(s)        Due            Colorectal Cancer Screen (Colonoscopy) (Male) due  10/27/20  Variable frequency            Colorectal Cancer Screen (Occult Blood) (Male) due  10/27/20  Variable frequency           Colorectal Cancer Screen (Sigmoidoscopy) (Male) due  10/27/20  Variable frequency           Hepatitis C Screen [id number] (Male) due  10/27/20  One-time only        Due In Future            Tobacco Use Screen (Male) not due until  06/09/21  and every 1  year(s)           Body Mass Index Check (Male) not due until  06/23/21  and every 1  year(s)           High Blood Pressure Screen (Male) not due until  06/23/21  and every 1  year(s)           Obesity Screen and Counseling (Male) not due until  06/23/21  and every 1  year(s)     Satisfied (in the past 1 year)        Satisfied            Body Mass Index Check (Male) on  06/23/20.           Body Mass Index Check (Male) on  06/02/20.           Body Mass Index Check (Male) on  02/24/20.           Body Mass Index Check (Male) on  12/18/19.           Body Mass Index Check (Male) on  12/12/19.           High Blood Pressure Screen (Male) on  06/23/20.           High Blood Pressure Screen (Male) on  06/02/20.           High Blood Pressure Screen (Male) on  02/24/20.           High Blood Pressure Screen (Male) on  12/18/19.           High Blood Pressure Screen (Male) on  12/12/19.           Obesity Screen and Counseling (Male) on  06/23/20.           Obesity Screen and Counseling (Male) on  06/02/20.           Obesity Screen and Counseling (Male) on  02/24/20.           Obesity Screen and Counseling (Male) on  12/18/19.           Obesity Screen and Counseling (Male) on  12/12/19.           Tobacco Use Screen (Male) on  06/09/20.           Tobacco Use Screen (Male) on  02/24/20.           Tobacco Use Screen (Male) on  12/18/19.           Tobacco Use Screen (Male) on  12/12/19.

## 2022-02-16 NOTE — TELEPHONE ENCOUNTER
---------------------  From: Becca Hernandez LPN (Phone Messages Pool (17824[facility]))   To: Referral Coordinators Pool ([id number]_[facility]); [employer] Message Pool (50524[facility]);     Sent: 10/27/2020 1:20:15 PM CDT  Subject: CONSUMER MESSAGE FW: Mri     MRI order placed 10/27      ---------------------  From: DIONTE CUNNINGHAM  To: [facility]  Sent: 10/27/2020 01:11 p.m. CDT  Subject: Mri  Ede I have not heard from anybody. I am in a lot of pain.Patients wife lvm @ [phone] asking that this be scheduled asap due to pain level. # [phone]---------------------  From: Sissy Pierce CMA ([facility] Message Pool ([id number]_[facility]))   To: Referral Coordinators Pool ([id number]_[facility]);     Sent: 10/27/2020 2:41:40 PM CDT  Subject: FW: CONSUMER MESSAGE FW: Mri

## 2022-02-16 NOTE — TELEPHONE ENCOUNTER
Order, demographics, and notes brought to [facility] per patients request, they will contact him to schedule.

## 2022-02-16 NOTE — NURSING NOTE
Comprehensive Intake Entered On:  12/18/2019 2:47 PM CST    Performed On:  12/18/2019 2:44 PM CST by Lashonda Hurt MA               Summary   Chief Complaint :   Follow up Gout, meds not working   Menstrual Status :   N/A   Weight Measured :   288 lb(Converted to: 288 lb 0 oz, 130.63 kg)    Height Measured :   71.5 in(Converted to: 5 ft 11 in, 181.61 cm)    Body Mass Index :   39.6 kg/m2 (HI)    Body Surface Area :   2.56 m2   Systolic Blood Pressure :   126 mmHg   Diastolic Blood Pressure :   70 mmHg   Mean Arterial Pressure :   89 mmHg   Peripheral Pulse Rate :   80 bpm   BP Site :   Right arm   Pulse Site :   Radial artery   BP Method :   Manual   HR Method :   Manual   Temperature Tympanic :   97.8 DegF(Converted to: 36.6 DegC)  (LOW)    Lashonda Hurt MA - 12/18/2019 2:44 PM CST   Health Status   Allergies Verified? :   Yes   Medication History Verified? :   Yes   Immunizations Current :   Yes   Medical History Verified? :   Yes   Pre-Visit Planning Status :   Completed   Tobacco Use? :   Never smoker   Lashonda Hurt MA - 12/18/2019 2:44 PM CST   Consents   Consent for Immunization Exchange :   Consent Granted   Consent for Immunizations to Providers :   Consent Granted   Lashonda Hurt MA - 12/18/2019 2:44 PM CST   Meds / Allergies   (As Of: 12/18/2019 2:47:58 PM CST)   Allergies (Active)   Allegra-D 12 Hour  Estimated Onset Date:   Unspecified ; Created By:   Hanna Kothari; Reaction Status:   Active ; Category:   Drug ; Substance:   Allegra-D 12 Hour ; Type:   Allergy ; Updated By:   Hanna Kothari; Reviewed Date:   12/18/2019 2:46 PM CST      penicillin V potassium  Estimated Onset Date:   Unspecified ; Reactions:   unknown ; Created By:   Meche Rodríguez; Reaction Status:   Active ; Category:   Drug ; Substance:   penicillin V potassium ; Type:   Allergy ; Updated By:   Meche Rodríguez; Reviewed Date:   12/18/2019 2:46 PM CST        Medication List   (As Of: 12/18/2019 2:47:58 PM CST)    Prescription/Discharge Order    allopurinol  :   allopurinol ; Status:   Prescribed ; Ordered As Mnemonic:   allopurinol 300 mg oral tablet ; Simple Display Line:   300 mg, 1 tab(s), Oral, daily, 90 tab(s), 3 Refill(s) ; Ordering Provider:   Ede Rowland PA-C; Catalog Code:   allopurinol ; Order Dt/Tm:   12/12/2019 9:47:35 AM CST          meloxicam  :   meloxicam ; Status:   Prescribed ; Ordered As Mnemonic:   meloxicam 15 mg oral tablet ; Simple Display Line:   15 mg, 1 tab(s), PO, Daily, 90 tab(s), 0 Refill(s) ; Ordering Provider:   Ede Rowland PA-C; Catalog Code:   meloxicam ; Order Dt/Tm:   12/12/2019 9:47:06 AM CST          lisinopril  :   lisinopril ; Status:   Prescribed ; Ordered As Mnemonic:   lisinopril 20 mg oral tablet ; Simple Display Line:   20 mg, 1 tab(s), PO, Daily, 90 tab(s), 3 Refill(s) ; Ordering Provider:   Lui Sweeney MD; Catalog Code:   lisinopril ; Order Dt/Tm:   4/16/2019 3:31:40 PM CDT          FLUoxetine  :   FLUoxetine ; Status:   Prescribed ; Ordered As Mnemonic:   FLUoxetine 20 mg oral capsule ; Simple Display Line:   20 mg, 1 cap(s), PO, Daily, 90 cap(s), 3 Refill(s) ; Ordering Provider:   Lui Sweeney MD; Catalog Code:   FLUoxetine ; Order Dt/Tm:   4/16/2019 3:31:39 PM CDT          Miscellaneous Rx Supply  :   Miscellaneous Rx Supply ; Status:   Prescribed ; Ordered As Mnemonic:   Knee high compression stockings ; Simple Display Line:   2 pair, top, daily, dispense 2 Pair, 2 EA, 2 Refill(s) ; Ordering Provider:   Lui Sweeney MD; Catalog Code:   Miscellaneous Rx Supply ; Order Dt/Tm:   4/3/2018 3:42:44 PM CDT          aspirin  :   aspirin ; Status:   Prescribed ; Ordered As Mnemonic:   aspirin 325 mg oral delayed release tablet ; Simple Display Line:   325 mg, 1 tab(s), PO, Daily, 30 tab(s), 0 Refill(s) ; Ordering Provider:   Lui Sweeney MD; Catalog Code:   aspirin ; Order Dt/Tm:   3/31/2017 2:48:13 PM CDT            Home Meds     Miscellaneous Rx Supply  :   Miscellaneous Rx Supply ; Status:   Documented ; Ordered As Mnemonic:   C-Pap machine @ noc. ; Catalog Code:   Miscellaneous Rx Supply ; Order Dt/Tm:   6/22/2012 11:09:38 AM CDT

## 2022-02-16 NOTE — PROGRESS NOTES
Patient:   DIONTE CUNNINGHAM            MRN: [id number]            FIN: [id number]               Age:   53 years     Sex:  Male     :  1965   Associated Diagnoses:   Hypertension; Depression, Recurrent   Author:   Lui Sweeney MD      Visit Information      Date of Service: 2019 03:07 pm  Performing Location: [facility]  Encounter#: [id number]      Primary Care Provider (PCP):  Lui Sweeney MD    NPI# [id number]      Referring Provider:  Lui Sweeney MD    NPI# [id number]      Chief Complaint   2019 3:12 PM CDT    htn and depression med check     Chief complaint and symptoms noted above confirmed with patient.      History of Present Illness             The patient presents for follow-up evaluation of hypertension.  The quality of hypertension symptom(s) since the patient's last visit is described as being unchanged.  The severity of the hypertension symptom(s) since the last visit is moderate.  Since the patient's last visit, the timing/course of hypertension symptom(s) is constant.  Exacerbating factors consist of none.  Relieving factors consist of medication.        Interval History   Depression   Side effects of medication unchanged.  The course is progressing as expected.  The effect on daily activities is change in activity level.  Associated symptoms characterized by no insomnia, weight gain, weight loss, loss of appetite or suicidal thoughts.        Review of Systems   Constitutional:  Negative.    Respiratory:  Negative.    Cardiovascular:  Negative.    Gastrointestinal:  Negative.    Musculoskeletal:  Negative.    Neurologic:  Negative.    Psychiatric:  Negative.       Health Status   Allergies:    Allergic Reactions (Selected)  Severity Not Documented  Allegra-D 12 Hour (No reactions were documented)  Penicillin V potassium (Unknown)   Medications:  (Selected)   Prescriptions  Prescribed  FLUoxetine 20 mg oral capsule: = 1 cap(s) ( 20 mg ), PO,  Daily, # 90 cap(s), 3 Refill(s), Type: Maintenance, Pharmacy: [id number], 1 cap(s) Oral daily  Knee high compression stockings: Knee high compression stockings, 2 pair, top, daily, Instructions: dispense 2 Pair, Supply, # 2 EA, 2 Refill(s), Type: Maintenance  aspirin 325 mg oral delayed release tablet: 1 tab(s) ( 325 mg ), PO, Daily, # 30 tab(s), 0 Refill(s), Type: Maintenance, OTC (Rx)  lisinopril 20 mg oral tablet: = 1 tab(s) ( 20 mg ), PO, Daily, # 90 tab(s), 3 Refill(s), Type: Maintenance, Pharmacy: [id number], 1 tab(s) Oral daily  meloxicam 15 mg oral tablet: = 1 tab(s) ( 15 mg ), PO, Daily, # 90 tab(s), 0 Refill(s), Type: Maintenance, Pharmacy: [facility] #[id number], 1 tab(s) Oral daily  Documented Medications  Documented  C-Pap machine @ noc.: C-Pap machine @ noc., 0 Refill(s), Type: Soft Stop   Problem list:    All Problems (Selected)  Family history of kidney stone / SNOMED CT 2465567165 / Confirmed  History of DVT in adulthood / SNOMED CT 8577687264 / Confirmed  Hypertension / SNOMED CT 4634459180 / Confirmed  Obese / ICD-9-.00 / Probable  LISBETH (Obstructive Sleep Apnea) / ICD-9-.23 / Confirmed  Depression, Recurrent / SNOMED CT 573472737 / Confirmed      Histories   Past Medical History:    Active  Depression, Recurrent (SNOMED CT 425012166)  Obese (ICD-9-.00)  Family history of kidney stone (SNOMED CT 6596494570)   Family History:    CA - Lung cancer  Mother  CVA - Cerebrovascular accident  Mother     Procedure history:    Bone spur (SNOMED CT [id number]) performed by Tomasa Henley DPM on 8/8/2016 at 50 Years.  Comments:  8/16/2016 9:28 AM CDT - Sheridan Ignacio  Left hallux.  Vasectomy (SNOMED CT 12169643) performed by Lui Sweeney MD on 12/16/2005 at 39 Years.  Ventricular septal defect, repaired (SNOMED CT 102705983).   Social History:        Alcohol Assessment: Current            1-2 per wk      Tobacco Assessment: Denies  Tobacco Use            Never      Substance Abuse Assessment: Denies Substance Abuse            Never      Home and Environment Assessment            Marital status: .  Spouse/Partner name: Flores.      Exercise and Physical Activity Assessment: Regular exercise      Physical Examination   Vital Signs   4/16/2019 3:12 PM CDT Temperature Tympanic 98.3 DegF    Peripheral Pulse Rate 58 bpm  LOW    Systolic Blood Pressure 140 mmHg  HI    Diastolic Blood Pressure 78 mmHg    Mean Arterial Pressure 99 mmHg      Measurements from flowsheet : Measurements   4/16/2019 3:12 PM CDT Height Measured - Standard 71.5 in    Weight Measured - Standard 287.4 lb    BSA 2.56 m2    Body Mass Index 39.52 kg/m2  HI      Documented vital signs:       Blood Pressure: Systolic  132  mmHg, Diastolic  75  mmHg.    General:  Alert and oriented, No acute distress.    Eye:  Normal conjunctiva.    HENT:  Normocephalic, Tympanic membranes are clear.    Neck:  Supple, Non-tender.    Respiratory:  Lungs are clear to auscultation, Respirations are non-labored, Breath sounds are equal.    Cardiovascular:  Normal rate, Regular rhythm, No murmur, No gallop.    Gastrointestinal:  Soft, Non-tender, Non-distended, Normal bowel sounds.    Musculoskeletal:       Lower extremity exam: Lower leg ( No tenderness ).    Integumentary:  Warm, Dry, Pink.    Neurologic:  Alert, Oriented, No focal deficits.    Psychiatric:  Cooperative, Appropriate mood & affect.       Impression and Plan   Diagnosis     Hypertension (CYB30-GB I10).     Plan:       Diet: Low cholesterol, Low fat, Sodium restricted.    Patient Instructions:       Counseled: Patient, Regarding diagnosis, Regarding treatment, Regarding medications, Diet, Activity, Verbalized understanding.    Orders     Orders (Selected)   Prescriptions  Prescribed  lisinopril 20 mg oral tablet: = 1 tab(s) ( 20 mg ), PO, Daily, # 90 tab(s), 3 Refill(s), Type: Maintenance, Pharmacy: [id number], 1  tab(s) Oral daily.     Diagnosis     Depression, Recurrent (FSG63-DZ F33.42).     Course:  Well controlled, Good response to treatment.    Orders     Orders (Selected)   Prescriptions  Prescribed  FLUoxetine 20 mg oral capsule: = 1 cap(s) ( 20 mg ), PO, Daily, # 90 cap(s), 3 Refill(s), Type: Maintenance, Pharmacy: [id number], 1 cap(s) Oral daily.

## 2022-02-16 NOTE — NURSING NOTE
Attempted to call patients cell # [phone] [phone] prior to video visit, but no ring tone. Will try again later.NIKI @ [phone]. Will call later if no call back.

## 2022-02-16 NOTE — TELEPHONE ENCOUNTER
---------------------  From: Zahra ENRIQUEZ Kirtland   To: DIONTE CUNNINGHAM    Sent: 6/3/2020 12:47:08 PM CDT  Subject: Patient Message - Results Notification        Please make an appointment to discuss these results.    Ramirez Sweeney MD    Results:  Date Result Name Ind Value Ref Range   6/2/2020 3:11 PM Sodium Level  141 mmol/L (135 - 146)   6/2/2020 3:11 PM Potassium Level  4.8 mmol/L (3.5 - 5.3)   6/2/2020 3:11 PM Chloride Level  110 mmol/L (98 - 110)   6/2/2020 3:11 PM CO2 Level  25 mmol/L (20 - 32)   6/2/2020 3:11 PM Glucose Level  76 mg/dL (65 - 99)   6/2/2020 3:11 PM BUN  25 mg/dL (7 - 25)   6/2/2020 3:11 PM Creatinine Level ((H)) 1.82 mg/dL (0.70 - 1.33)   6/2/2020 3:11 PM BUN/Creat Ratio  14 (6 - 22)   6/2/2020 3:11 PM eGFR ((L)) 41 mL/min/1.73m2 (> OR = 60 - )   6/2/2020 3:11 PM eGFR African American ((L)) 48 mL/min/1.73m2 (> OR = 60 - )   6/2/2020 3:11 PM Calcium Level  9.6 mg/dL (8.6 - 10.3)   6/2/2020 3:11 PM WBC  5.9 (3.8 - 10.8)   6/2/2020 3:11 PM RBC  4.23 (4.20 - 5.80)   6/2/2020 3:11 PM Hgb ((L)) 12.5 gm/dL (13.2 - 17.1)   6/2/2020 3:11 PM Hct ((L)) 36.8 % (38.5 - 50.0)   6/2/2020 3:11 PM MCV  87.0 fL (80.0 - 100.0)   6/2/2020 3:11 PM MCH  29.6 pg (27.0 - 33.0)   6/2/2020 3:11 PM MCHC  34.0 gm/dL (32.0 - 36.0)   6/2/2020 3:11 PM RDW  13.1 % (11.0 - 15.0)   6/2/2020 3:11 PM Platelet  242 (140 - 400)   6/2/2020 3:11 PM MPV  10.4 fL (7.5 - 12.5)

## 2022-02-16 NOTE — NURSING NOTE
Comprehensive Intake Entered On:  6/2/2020 3:12 PM CDT    Performed On:  6/2/2020 3:11 PM CDT by Schoenike , Andrea Summary   Chief Complaint :   Lab draw and vitals   Menstrual Status :   N/A   Weight Measured :   285.6 lb(Converted to: 285 lb 10 oz, 129.55 kg)    Height Measured :   71.5 in(Converted to: 5 ft 11 in, 181.61 cm)    Body Mass Index :   39.27 kg/m2 (HI)    Body Surface Area :   2.55 m2   Systolic Blood Pressure :   115 mmHg   Diastolic Blood Pressure :   71 mmHg   Mean Arterial Pressure :   86 mmHg   Peripheral Pulse Rate :   73 bpm   BP Site :   Left arm   Pulse Site :   Radial artery   BP Method :   Electronic   HR Method :   Electronic   Oxygen Saturation :   96 %   Schoenike , Andrea - 6/2/2020 3:11 PM CDT   ID Risk Screen   Recent Travel History :   No recent travel   Family Member Travel History :   No recent travel   Other Exposure to Infectious Disease :   Unknown   Schoenike , Andrea - 6/2/2020 3:11 PM CDT

## 2022-02-16 NOTE — PROGRESS NOTES
Patient:   DIONTE CUNNINGHAM            MRN: [id number]            FIN: [id number]               Age:   52 years     Sex:  Male     :  1965   Associated Diagnoses:   Hypertension; Depression, Recurrent   Author:   Lui Sweeney MD      Visit Information      Date of Service: 2018 03:14 pm  Performing Location: [facility]  Encounter#: [id number]      Primary Care Provider (PCP):  Lui Sweeney MD    NPI# [id number]      Referring Provider:  Lui Sweeney MD    NPI# [id number]      Chief Complaint   4/3/2018 3:21 PM CDT     HTN Med Check & due for blood work     Chief complaint and symptoms noted above confirmed with patient.      History of Present Illness             The patient presents for evaluation of hypertension.  The hypertension  is characterized by no symptoms.  The severity of the hypertension symptom(s) is moderate.  The timing/course of hypertension symptom(s) is constant.  The elevated blood pressure was first identified during a routine physical exam, was first identified during a visit to provider for illness, was first identified during a health fair, was first identified at a local drug store technique: via automated technique.  The patient's blood pressure was confirmed with repeated measures.  Exacerbating factors consist of missed medication.  Relieving factors consist of none.  Prior treatment consists of none and OTC Chiropractic.        Interval History   Depression   Side effects of medication unchanged.  The course is progressing as expected.  The effect on daily activities is change in activity level.        Review of Systems   Constitutional:  Negative.    Respiratory:  Negative.    Cardiovascular:  Negative.    Gastrointestinal:  Negative.    Musculoskeletal:  Negative.    Neurologic:  Negative.    Psychiatric:  Negative.       Health Status   Allergies:    Allergic Reactions (Selected)  Severity Not Documented  Allegra-D 12 Hour (No  reactions were documented)  Penicillin V potassium (Unknown)   Medications:  (Selected)   Prescriptions  Prescribed  FLUoxetine 20 mg oral capsule: 1 cap(s) ( 20 mg ), PO, Daily, # 90 cap(s), 3 Refill(s), Type: Maintenance, Pharmacy: [id number], 1 cap(s) po daily  Knee high compression stockings: Knee high compression stockings, 1 pair, top, daily, Instructions: dispense 2 Pair, Supply, # 2 EA, 2 Refill(s), Type: Maintenance  aspirin 325 mg oral delayed release tablet: 1 tab(s) ( 325 mg ), PO, Daily, # 30 tab(s), 0 Refill(s), Type: Maintenance, OTC (Rx)  lisinopril 20 mg oral tablet: 1 tab(s) ( 20 mg ), PO, Daily, # 90 tab(s), 3 Refill(s), Type: Maintenance, Pharmacy: [facility] #[id number], 1 tab(s) po daily  Documented Medications  Documented  C-Pap machine @ noc.: C-Pap machine @ noc., 0 Refill(s), Type: Soft Stop   Problem list:    All Problems (Selected)  Family history of kidney stone / SNOMED CT 6593152656 / Confirmed  History of DVT in adulthood / SNOMED CT 4956362074 / Confirmed  Hypertension / SNOMED CT 4201458347 / Confirmed  Obese / ICD-9-.00 / Probable  LISBETH (Obstructive Sleep Apnea) / ICD-9-.23 / Confirmed  Depression, Recurrent / SNOMED CT 098250938 / Confirmed      Histories   Past Medical History:    Active  Depression, Recurrent (SNOMED CT 015550764)  Obese (ICD-9-.00)  Family history of kidney stone (SNOMED CT 4003987445)   Family History:    CA - Lung cancer  Mother  CVA - Cerebrovascular accident  Mother     Procedure history:    Bone spur (SNOMED CT [id number]) performed by Tomasa Henley DPM on 8/8/2016 at 50 Years.  Comments:  8/16/2016 9:28 AM - Sheridan Ignacio  Left hallux.  Vasectomy (SNOMED CT 28223717) performed by Lui Sweeney MD on 12/16/2005 at 39 Years.  Ventricular septal defect, repaired (SNOMED CT 947673490).   Social History:        Alcohol Assessment: Current            1-2 per wk      Tobacco Assessment: Denies Tobacco  Use            Never      Substance Abuse Assessment: Denies Substance Abuse            Never      Home and Environment Assessment            Marital status: .  Spouse/Partner name: Flores.      Exercise and Physical Activity Assessment: Regular exercise        Physical Examination   Vital Signs   4/3/2018 3:21 PM CDT Temperature Tympanic 98.1 DegF    Peripheral Pulse Rate 84 bpm    Pulse Site Radial artery    HR Method Manual    Systolic Blood Pressure 136 mmHg  HI    Diastolic Blood Pressure 84 mmHg  HI    Mean Arterial Pressure 101 mmHg    BP Site Right arm    BP Method Manual      Measurements from flowsheet : Measurements   4/3/2018 3:21 PM CDT Height Measured - Standard 71.5 in    Weight Measured - Standard 285 lb    BSA 2.55 m2    Body Mass Index 39.19 kg/m2  HI      General:  Alert and oriented, No acute distress.    Eye:  Normal conjunctiva.    HENT:  Normocephalic, Tympanic membranes are clear.    Neck:  Supple, Non-tender.    Respiratory:  Lungs are clear to auscultation, Respirations are non-labored, Breath sounds are equal.    Cardiovascular:  Normal rate, Regular rhythm, No murmur, No gallop.    Gastrointestinal:  Soft, Non-tender, Non-distended, Normal bowel sounds.    Musculoskeletal:       Lower extremity exam: Lower leg ( No tenderness ).    Integumentary:  Warm, Dry, Pink.    Neurologic:  Alert, Oriented, No focal deficits.    Psychiatric:  Cooperative, Appropriate mood & affect.       Impression and Plan   Diagnosis     Hypertension (TFU15-WF I10).     Plan:       Diet: Low cholesterol, Low fat, Sodium restricted.    Patient Instructions:       Counseled: Patient, Regarding diagnosis, Regarding treatment, Regarding medications, Diet, Activity, Verbalized understanding.    Orders   Diagnosis     Depression, Recurrent (QZH52-JO F33.42).     Orders     Orders (Selected)   Prescriptions  Prescribed  lisinopril 20 mg oral tablet: 1 tab(s) ( 20 mg ), PO, Daily, # 90 tab(s), 3 Refill(s), Type:  Maintenance, Pharmacy: [facility] #[id number], 1 tab(s) po daily.

## 2022-02-16 NOTE — PROGRESS NOTES
Patient:   DIONTE CUNNINGHAM            MRN: [id number]            FIN: [id number]               Age:   53 years     Sex:  Male     :  1965   Associated Diagnoses:   Acute gout of foot   Author:   Ede Rowland PA-C      Visit Information   Visit type:  General concerns.    Accompanied by:  No one.    Source of history:  Self.    Referral source:  Self.    History limitation:  None.       Chief Complaint   2019 9:40 AM CST   possible gout in the right foot started Monday        Interval History   Gout   The course is worsening.  Associated symptoms characterized by joint redness and joint warmth.  Right first MTP. Has flares from time to time. Ran out of Meloxicam. No injury. Needs new RX. CC above noted and confirmed with the patient. History of infrequent gout attacks. history of elevated uric acid level. Never used allopurinol..        Review of Systems   Constitutional:  Negative.    Musculoskeletal:  Negative except as documented in history of present illness.    Integumentary:  Negative except as documented in history of present illness.       Health Status   Allergies:    Allergic Reactions (All)  Severity Not Documented  Allegra-D 12 Hour (No reactions were documented)  Penicillin V potassium (Unknown)   Medications:  (Selected)   Prescriptions  Prescribed  FLUoxetine 20 mg oral capsule: = 1 cap(s) ( 20 mg ), PO, Daily, # 90 cap(s), 3 Refill(s), Type: Maintenance, Pharmacy: [id number], 1 cap(s) Oral daily  Knee high compression stockings: Knee high compression stockings, 2 pair, top, daily, Instructions: dispense 2 Pair, Supply, # 2 EA, 2 Refill(s), Type: Maintenance  allopurinol 300 mg oral tablet: = 1 tab(s) ( 300 mg ), Oral, daily, # 90 tab(s), 3 Refill(s), Type: Maintenance, Pharmacy: [facility] #[id number], 1 tab(s) Oral daily  aspirin 325 mg oral delayed release tablet: 1 tab(s) ( 325 mg ), PO, Daily, # 30 tab(s), 0 Refill(s), Type: Maintenance, OTC (Rx)  lisinopril 20  mg oral tablet: = 1 tab(s) ( 20 mg ), PO, Daily, # 90 tab(s), 3 Refill(s), Type: Maintenance, Pharmacy: [id number], 1 tab(s) Oral daily  meloxicam 15 mg oral tablet: = 1 tab(s) ( 15 mg ), PO, Daily, # 90 tab(s), 0 Refill(s), Type: Maintenance, Pharmacy: [facility] #[id number], 1 tab(s) Oral daily  predniSONE 20 mg oral tablet: = 2 tab(s) ( 40 mg ), Oral, daily, x 7 day(s), # 14 tab(s), 0 Refill(s), Type: Acute, Pharmacy: [facility] #[id number], 2 tab(s) Oral daily,x7 day(s)  Documented Medications  Documented  C-Pap machine @ noc.: C-Pap machine @ noc., 0 Refill(s), Type: Soft Stop   Problem list:    All Problems  LISBETH (Obstructive Sleep Apnea) / ICD-9-.23 / Confirmed  Obese / ICD-9-.00 / Probable  Hypertension / SNOMED CT 3917497468 / Confirmed  History of DVT in adulthood / SNOMED CT 1973478058 / Confirmed  Family history of kidney stone / SNOMED CT 6964587562 / Confirmed  Depression, Recurrent / SNOMED CT 976082424 / Confirmed  Inactive: History of vertigo / SNOMED CT 760549885      Histories   Past Medical History:    Active  Depression, Recurrent (216572578)  Obese (278.00)  Family history of kidney stone (2210226079)   Family History:    CA - Lung cancer  Mother  CVA - Cerebrovascular accident  Mother     Procedure history:    Bone spur ([id number]) on 8/8/2016 at 50 Years.  Comments:  8/16/2016 9:28 AM CDT - Sheridan Ignacio  Left hallux.  Vasectomy (67307170) on 12/16/2005 at 39 Years.  Ventricular septal defect, repaired (416294284).      Physical Examination   Vital Signs   12/12/2019 9:40 AM CST Peripheral Pulse Rate 74 bpm    Systolic Blood Pressure 120 mmHg    Diastolic Blood Pressure 80 mmHg    Mean Arterial Pressure 93 mmHg    Oxygen Saturation 98 %      Measurements from flowsheet : Measurements   12/12/2019 9:40 AM CST Height Measured - Standard 71.5 in    Weight Measured - Standard 288 lb    BSA 2.56 m2    Body Mass Index 39.6 kg/m2  HI       Respiratory:  Respirations are non-labored.    Cardiovascular:  Normal rate.         Arterial pulses: Right, Dorsalis pedis, 2+.    Musculoskeletal:  Right first MTP laterally is swollen, erythematous and warm to touch. Pedal pulse intact. Skin intact..       Impression and Plan   Diagnosis     Acute gout of foot (LLC12-VN M10.9).     Patient Instructions:       Counseled: Patient, Regarding diagnosis, Regarding medications, Activity, Verbalized understanding.    Orders     Orders (Selected)   Prescriptions  Prescribed  allopurinol 300 mg oral tablet: = 1 tab(s) ( 300 mg ), Oral, daily, # 90 tab(s), 3 Refill(s), Type: Maintenance, Pharmacy: [facility] #[id number], 1 tab(s) Oral daily  meloxicam 15 mg oral tablet: = 1 tab(s) ( 15 mg ), PO, Daily, # 90 tab(s), 0 Refill(s), Type: Maintenance, Pharmacy: [facility] #[id number], 1 tab(s) Oral daily  predniSONE 20 mg oral tablet: = 2 tab(s) ( 40 mg ), Oral, daily, x 7 day(s), # 14 tab(s), 0 Refill(s), Type: Acute, Pharmacy: [facility] #[id number], 2 tab(s) Oral daily,x7 day(s).     Mobic for future attacks. Start allopurinol in 10-14 days. Warned may precipitate another attack.

## 2022-02-16 NOTE — LETTER
(Inserted Image. Unable to display)     [address] [place] [address]  [phone] (phone)  [phone] (fax)  DIONTE CUNNINGHAM    [address] [place] [address] [address] [place], [place] [id number]        Dear DIONTE,    Thank you for selecting our practice as your care provider. Below you will find the results and recent diagnostic testings outcomes we have reviewed.        Please make an appointment to discuss these results.  Please avoid all ibuprofen or naproxen products (advil, aleve).      Result Name Current Result Previous Result Reference Range   Sodium Level (mmol/L)  141 6/2/2020  135 - 146   Potassium Level (mmol/L)  4.8 6/2/2020  3.5 - 5.3   Chloride Level (mmol/L)  110 6/2/2020  98 - 110   CO2 Level (mmol/L)  25 6/2/2020  20 - 32   Glucose Level (mg/dL)  76 6/2/2020  65 - 99   BUN (mg/dL)  25 6/2/2020  7 - 25   Creatinine Level (mg/dL) ((H)) 1.82 6/2/2020  0.70 - 1.33   BUN/Creat Ratio  14 6/2/2020  6 - 22   eGFR (mL/min/1.73m2) ((L)) 41 6/2/2020  > OR = 60 -    Calcium Level (mg/dL)  9.6 6/2/2020  8.6 - 10.3   WBC  5.9 6/2/2020  9.7 12/18/2019 3.8 - 10.8   RBC  4.23 6/2/2020  4.54 12/18/2019 4.20 - 5.80   Hgb (gm/dL) ((L)) 12.5 6/2/2020 ((L)) 13.4 12/18/2019 13.2 - 17.1   Hct (%) ((L)) 36.8 6/2/2020  40.5 12/18/2019 38.5 - 50.0   MCV (fL)  87.0 6/2/2020  89 12/18/2019 80.0 - 100.0   MCH (pg)  29.6 6/2/2020  29.5 12/18/2019 27.0 - 33.0   MCHC (gm/dL)  34.0 6/2/2020  33.1 12/18/2019 32.0 - 36.0   RDW (%)  13.1 6/2/2020  13.8 12/18/2019 11.0 - 15.0   Platelet  242 6/2/2020  299 12/18/2019 140 - 400   MPV (fL)  10.4 6/2/2020  10.7 12/18/2019 7.5 - 12.5       Please contact my practice at the number listed above if you have any questions or concerns.     Sincerely,        Lui Sweeney MD, FAAFP

## 2022-02-16 NOTE — TELEPHONE ENCOUNTER
---------------------  From: Ty Hill MD   To: DIONTE CUNNINGHAM    Sent: 4/29/2021 9:30:25 AM CDT  Subject: General Message     The high CRP is consistent with recent gout.   No signs of a disease that caused your rash.   I hope the medication is improving the rash and it does represent allergies.      Results:  Date Result Name Ind Value Ref Range   4/26/2021 11:34 AM Sodium Level  138 mmol/L (135 - 146)   4/26/2021 11:34 AM Potassium Level  4.8 mmol/L (3.5 - 5.3)   4/26/2021 11:34 AM Chloride Level  104 mmol/L (98 - 110)   4/26/2021 11:34 AM CO2 Level  25 mmol/L (20 - 32)   4/26/2021 11:34 AM Glucose Level  90 mg/dL (65 - 99)   4/26/2021 11:34 AM BUN  16 mg/dL (7 - 25)   4/26/2021 11:34 AM Creatinine Level  1.13 mg/dL (0.70 - 1.33)   4/26/2021 11:34 AM BUN/Creat Ratio  NOT APPLICABLE (6 - 22)   4/26/2021 11:34 AM eGFR  73 mL/min/1.73m2 (> OR = 60 - )   4/26/2021 11:34 AM eGFR African American  84 mL/min/1.73m2 (> OR = 60 - )   4/26/2021 11:34 AM Calcium Level  9.6 mg/dL (8.6 - 10.3)   4/26/2021 11:34 AM Bilirubin Total  0.7 mg/dL (0.2 - 1.2)   4/26/2021 11:34 AM Bilirubin Direct  0.1 mg/dL ( - < OR = 0.2)   4/26/2021 11:34 AM Bilirubin Indirect  0.6 (0.2 - 1.2)   4/26/2021 11:34 AM Alkaline Phosphatase  73 unit/L (35 - 144)   4/26/2021 11:34 AM AST/SGOT  26 unit/L (10 - 35)   4/26/2021 11:34 AM ALT/SGPT  35 unit/L (9 - 46)   4/26/2021 11:34 AM Protein Total  7.3 gm/dL (6.1 - 8.1)   4/26/2021 11:34 AM Albumin Level  4.2 gm/dL (3.6 - 5.1)   4/26/2021 11:34 AM Globulin  3.1 (1.9 - 3.7)   4/26/2021 11:34 AM A/G Ratio  1.4 (1.0 - 2.5)   4/26/2021 11:34 AM C-Reactive Protein (CRP) ((H)) 16.0 mg/L ( - <8.0)   4/26/2021 11:34 AM TSH  1.85 mIU/L (0.40 - 4.50)   4/26/2021 11:34 AM WBC  5.5 (3.8 - 10.8)   4/26/2021 11:34 AM RBC  4.43 (4.20 - 5.80)   4/26/2021 11:34 AM Hgb  13.2 gm/dL (13.2 - 17.1)   4/26/2021 11:34 AM Hct  38.5 % (38.5 - 50.0)   4/26/2021 11:34 AM MCV  86.9 fL (80.0 - 100.0)   4/26/2021 11:34 AM  MCH  29.8 pg (27.0 - 33.0)   4/26/2021 11:34 AM MCHC  34.3 gm/dL (32.0 - 36.0)   4/26/2021 11:34 AM RDW  13.3 % (11.0 - 15.0)   4/26/2021 11:34 AM Platelet  245 (140 - 400)   4/26/2021 11:34 AM MPV  10.6 fL (7.5 - 12.5)   4/26/2021 11:34 AM Sed Rate  19 ( - < OR = 20)   4/26/2021 11:34 AM JACQUIE IFA  NEGATIVE (NEGATIVE - NEGATIVE)   4/26/2021 11:34 AM Rheumatoid Factor  <14 IU/mL ( - <14)   4/26/2021 11:34 AM Cyclic Citrullinated Peptide (CCP)  <16

## 2022-02-16 NOTE — NURSING NOTE
Comprehensive Intake Entered On:  6/23/2020 3:47 PM CDT    Performed On:  6/23/2020 3:46 PM CDT by Schoenike , Andrea               Summary   Chief Complaint :   Lab draw and vitals   Menstrual Status :   N/A   Weight Measured :   285.6 lb(Converted to: 285 lb 10 oz, 129.55 kg)    Height Measured :   71.5 in(Converted to: 5 ft 11 in, 181.61 cm)    Body Mass Index :   39.27 kg/m2 (HI)    Body Surface Area :   2.55 m2   Systolic Blood Pressure :   115 mmHg   Diastolic Blood Pressure :   70 mmHg   Mean Arterial Pressure :   85 mmHg   Peripheral Pulse Rate :   74 bpm   BP Site :   Left arm   Pulse Site :   Radial artery   BP Method :   Electronic   HR Method :   Electronic   Oxygen Saturation :   95 %   Schoenike , Andrea - 6/23/2020 3:46 PM CDT   ID Risk Screen   Recent Travel History :   No recent travel   Family Member Travel History :   No recent travel   Other Exposure to Infectious Disease :   Unknown   Schoenike , Andrea - 6/23/2020 3:46 PM CDT

## 2022-02-16 NOTE — LETTER
(Inserted Image. Unable to display)   April 03, 2019      DIONTE CUNNINGHAM  [address] [place], [place] [id number]        Dear DIONTE,      Thank you for selecting [facility] ([facility]) for your healthcare needs.     Our records indicate you are due for the following services:    Medication Check  No further contact will be made by the recall coordinators regarding this appt. If nothing has been done in three weeks please forward reminder to the Appointment Pool ([facility]).    You are due for lab work and an office visit; please schedule the lab appointment 1 week before the office visit.  This will assure all results are available to discuss with your provider during your visit.    **It is very helpful if you bring your medication bottles to your appointment.  This assures we have all of your current medications, including strength and dosing information, documented accurately in your medical record.    To schedule an appointment or if you have further questions, please contact your primary clinic:   [facility]  [phone]   [facility]  [phone]             [facility]      [phone]      Powered by [employer]    Sincerely,    Lui Sweeney M.D.

## 2022-02-16 NOTE — PROGRESS NOTES
Patient:   DIONTE CUNNINGHAM            MRN: [id number]            FIN: [id number]               Age:   54 years     Sex:  Male     :  1965   Associated Diagnoses:   Acute gout   Author:   Ede Rowland PA-C      Visit Information   Visit type:  General concerns.    Accompanied by:  No one.    Source of history:  Self.    Referral source:  Self.    History limitation:  None.       Chief Complaint   2020 2:54 PM CST    possible gout left foot x1 week        Interval History   Gout   The course is worsening.  Associated symptoms characterized by joint redness and joint warmth.  Did not take allopurinol. Colcrys worked well. No calf pain. Left ankle is swollen and painful. Leaves for [place] this coming weekend. No chest pain or SOB.        Review of Systems   Constitutional:  Negative.    Musculoskeletal:  Negative except as documented in history of present illness.    Integumentary:  Negative except as documented in history of present illness.       Health Status   Allergies:    Allergic Reactions (Selected)  Severity Not Documented  Allegra-D 12 Hour (No reactions were documented)  Penicillin V potassium (Unknown)   Medications:  (Selected)   Prescriptions  Prescribed  Colcrys 0.6 mg oral tablet: = 1 tab(s) ( 0.6 mg ), Oral, bid, # 14 tab(s), 1 Refill(s), Type: Maintenance, Pharmacy: [facility] #[id number], 1 tab(s) Oral bid,x7 day(s)  FLUoxetine 20 mg oral capsule: = 1 cap(s) ( 20 mg ), PO, Daily, # 90 cap(s), 3 Refill(s), Type: Maintenance, Pharmacy: [id number], 1 cap(s) Oral daily  Knee high compression stockings: Knee high compression stockings, 2 pair, top, daily, Instructions: dispense 2 Pair, Supply, # 2 EA, 2 Refill(s), Type: Maintenance  allopurinol 300 mg oral tablet: = 1 tab(s) ( 300 mg ), Oral, daily, # 90 tab(s), 3 Refill(s), Type: Maintenance, Pharmacy: [facility] #[id number], 1 tab(s) Oral daily  aspirin 325 mg oral delayed release tablet: 1 tab(s) ( 325 mg ), PO,  Daily, # 30 tab(s), 0 Refill(s), Type: Maintenance, OTC (Rx)  lisinopril 20 mg oral tablet: = 1 tab(s) ( 20 mg ), PO, Daily, # 90 tab(s), 3 Refill(s), Type: Maintenance, Pharmacy: [id number], 1 tab(s) Oral daily  meloxicam 15 mg oral tablet: = 1 tab(s) ( 15 mg ), PO, Daily, # 90 tab(s), 0 Refill(s), Type: Maintenance, Pharmacy: [facility] #[id number], 1 tab(s) Oral daily  Documented Medications  Documented  C-Pap machine @ noc.: C-Pap machine @ noc., 0 Refill(s), Type: Soft Stop   Problem list:    All Problems (Selected)  LISBETH (Obstructive Sleep Apnea) / ICD-9-.23 / Confirmed  Obese / ICD-9-.00 / Probable  Hypertension / SNOMED CT 0737031883 / Confirmed  History of DVT in adulthood / SNOMED CT 9791198848 / Confirmed  Family history of kidney stone / SNOMED CT 6887998978 / Confirmed  Depression, Recurrent / SNOMED CT 353689673 / Confirmed      Histories   Past Medical History:    Active  Depression, Recurrent (067413055)  Obese (278.00)  Family history of kidney stone (4208602176)   Family History:    CA - Lung cancer  Mother  CVA - Cerebrovascular accident  Mother     Procedure history:    Bone spur ([id number]) on 8/8/2016 at 50 Years.  Comments:  8/16/2016 9:28 AM CDT - Sheridan Ignacio  Left hallux.  Vasectomy (58400953) on 12/16/2005 at 39 Years.  Ventricular septal defect, repaired (172163583).      Physical Examination   Vital Signs   2/24/2020 2:54 PM CST Peripheral Pulse Rate 83 bpm    HR Method Electronic    Systolic Blood Pressure 118 mmHg    Diastolic Blood Pressure 70 mmHg    Mean Arterial Pressure 86 mmHg    BP Method Manual    Oxygen Saturation 94 %      Measurements from flowsheet : Measurements   2/24/2020 2:54 PM CST Height Measured - Standard 71.5 in    Weight Measured - Standard 292.0 lb    BSA 2.58 m2    Body Mass Index 40.15 kg/m2  HI      Respiratory:  Respirations are non-labored.    Cardiovascular:  Normal rate.         Arterial pulses:  Left, Posterior tibial, Dorsalis pedis, 2+.    Musculoskeletal:  Left ankle is swollen and painful. Skin intact. Not warm to touch. No calf or thigh pain..       Impression and Plan   Diagnosis     Acute gout (DNX17-TP M10.9).     Patient Instructions:       Counseled: Patient, Regarding diagnosis, Regarding medications, Activity.    Orders     Orders (Selected)   Outpatient Orders  Ordered  D-Dimer (Request): Priority: Urgent, Swelling of left ankle joint  Prescriptions  Prescribed  Colcrys 0.6 mg oral tablet: = 1 tab(s) ( 0.6 mg ), Oral, bid, # 14 tab(s), 1 Refill(s), Type: Maintenance, Pharmacy: [facility] #[id number], 1 tab(s) Oral bid,x7 day(s).     Start Allopurinol when returns from vacation to see if we can cut down on flares. RTC for calf pain, swelling, chest pain, SOB. If D-Dimer positive, will get US.

## 2022-02-16 NOTE — TELEPHONE ENCOUNTER
Entered by Shayla Jamison CMA on June 04, 2020 10:03:16 AM CDT  ---------------------  From: Shayla Jamison CMA   To: [facility] #[id number]    Sent: 6/4/2020 10:03:16 AM CDT  Subject: Medication Management     ** Not Approved: Patient has requested refill too soon, Filled6/3/2020 **  lisinopril  TAKE 1 TABLET BY MOUTH DAILY  Qty:  30 tab(s)        Refills:  0          Substitutions Allowed     Details:  30 tab(s), TAKE 1 TABLET BY MOUTH DAILY, Route to Pharmacy Electronically [facility] #[id number], 6/4/2020, 4/29/2020, 30, Lui Sweeney MD      Route To Pharmacy - [facility] #[id number]   Signed by Shayla Jamison CMA            ** Not Approved: Patient has requested refill too soon, Filled 6/4/2020 **  FLUoxetine  TAKE 1 CAPSULE BY MOUTH DAILY  Qty:  30 cap(s)        Refills:  0          Substitutions Allowed     Details:  30 cap(s), TAKE 1 CAPSULE BY MOUTH DAILY, Route to Pharmacy Electronically [facility] #[id number], 6/4/2020, 4/29/2020, 30, Lui Sweeney MD      Route To Pharmacy - [facility] #[id number]   Signed by Shayla Jamison CMA            ------------------------------------------  From: [facility] #[id number]  To: Lui Sweeney MD  Sent: June 4, 2020 8:59:24 AM CDT  Subject: Medication Management  Due: May 30, 2020 3:27:43 PM CDT     ** On Hold Pending Signature **     Dispensed Drug: FLUoxetine (FLUoxetine 20 mg oral capsule), TAKE 1 CAPSULE BY MOUTH DAILY  Quantity: 30 cap(s)  Days Supply: 30  Refills: 0  Substitutions Allowed  Notes from Pharmacy:     ** On Hold Pending Signature **     Dispensed Drug: lisinopril (lisinopril 20 mg oral tablet), TAKE 1 TABLET BY MOUTH DAILY  Quantity: 30 tab(s)  Days Supply: 30  Refills: 0  Substitutions Allowed  Notes from Pharmacy:  ------------------------------------------

## 2022-02-16 NOTE — TELEPHONE ENCOUNTER
---------------------  From: Lashonda Hurt MA (Phone Messages Pool ([id number]_[facility]))   To: Lui Sweeney MD;     Sent: 6/3/2020 1:37:11 PM CDT  Subject: FW: Patient Message - Results Notification     Patient will be out before appointment with you. Okay to fill?      ---------------------  From: DIONTE CUNNINGHAM  To: [facility]  Sent: 06/03/2020 01:07 p.m. CDT  Subject: FW: Patient Message - Results Notification  Could you call [facility] in [facility] to allow them to refill my presciptions . I have appointment with Dr. Sweeney next week  ---------------------  From: Lui Sweeney MD  To: DIONTE CUNNINGHAM  Sent: 6/3/2020 12:47:08 PM CDT  Subject: Patient Message - Results Notification       Please make an appointment to discuss these results.       Ramirez Sweeney MD       Results:  Date Result Name Ind Value Ref Range   6/2/2020 3:11 PM Sodium Level     141 mmol/L (135 - 146)   6/2/2020 3:11 PM Potassium Level     4.8 mmol/L (3.5 - 5.3)   6/2/2020 3:11 PM Chloride Level     110 mmol/L (98 - 110)   6/2/2020 3:11 PM CO2 Level     25 mmol/L (20 - 32)   6/2/2020 3:11 PM Glucose Level     76 mg/dL (65 - 99)   6/2/2020 3:11 PM BUN     25 mg/dL (7 - 25)   6/2/2020 3:11 PM Creatinine Level ((H)) 1.82 mg/dL (0.70 - 1.33)   6/2/2020 3:11 PM BUN/Creat Ratio     14 (6 - 22)   6/2/2020 3:11 PM eGFR ((L)) 41 mL/min/1.73m2 (> OR = 60 - )   6/2/2020 3:11 PM eGFR African American ((L)) 48 mL/min/1.73m2 (> OR = 60 - )   6/2/2020 3:11 PM Calcium Level     9.6 mg/dL (8.6 - 10.3)   6/2/2020 3:11 PM WBC     5.9 (3.8 - 10.8)   6/2/2020 3:11 PM RBC     4.23 (4.20 - 5.80)   6/2/2020 3:11 PM Hgb ((L)) 12.5 gm/dL (13.2 - 17.1)   6/2/2020 3:11 PM Hct ((L)) 36.8 % (38.5 - 50.0)   6/2/2020 3:11 PM MCV     87.0 fL (80.0 - 100.0)   6/2/2020 3:11 PM MCH     29.6 pg (27.0 - 33.0)   6/2/2020 3:11 PM MCHC     34.0 gm/dL (32.0 - 36.0)   6/2/2020 3:11 PM RDW     13.1 % (11.0 - 15.0)   6/2/2020 3:11  PM Platelet     242 (140 - 400)   6/2/2020 3:11 PM MPV     10.4 fL (7.5 - 12.5)  ** Submitted: **  Order:FLUoxetine (FLUoxetine 20 mg oral capsule)  1 cap(s)  Oral  daily  Qty:  90 cap(s)        Refills:  3          Substitutions Allowed     Route To [facility] #[id number]    Signed by Lui Sweeney MD  6/4/2020 1:15:00 PM    ** Submitted: **  Order:lisinopril (lisinopril 20 mg oral tablet)  1 tab(s)  Oral  daily  Qty:  90 tab(s)        Refills:  3          Substitutions Allowed     Route To [facility] #[id number]    Signed by Liu Sweeney MD  6/4/2020 1:15:00 PM    ** Submitted: **  Order:allopurinol (allopurinol 300 mg oral tablet)  1 tab(s)  Oral  daily  Qty:  90 tab(s)        Refills:  3          Substitutions Allowed     [facility] To [facility] #[id number]    Signed by Lui Sweeney MD  6/4/2020 1:15:00 PM---------------------  From: Lui Sweeney MD   To: Phone Messages Pool ([id number]_[place]); DIONTE CUNNINGHAM    Sent: 6/4/2020 8:16:48 AM CDT  Subject: RE: Patient Message - Results Notification     Rasta Polo,    I refilled the following three for now.  I want you to stop the meloxicam as your kidney function is reduced.  We can discuss this more at your appointment.    Ramirez Sweeney MDnoted

## 2022-02-16 NOTE — PROGRESS NOTES
Patient:   DIONTE CUNNINGHAM            MRN: [id number]            FIN: [id number]               Age:   51 years     Sex:  Male     :  1965   Associated Diagnoses:   Locked knee   Author:   Jadon Cat MD      Preoperative Information   Indication for surgery:  locked right knee.    Accompanied by:  Spouse.    Source of history:  Self.           Chief Complaint   2017 9:51 AM CDT    Right knee injury,  work comp.     HPI: Felt pop right knee at work last night going up stairs  Unable to straighten or walk  fell once today      Review of Systems   Constitutional:  Negative.    Eye:  Negative.    Ear/Nose/Mouth/Throat:  Negative.    Respiratory:  Negative.    Cardiovascular:  Negative.    Gastrointestinal:  Negative.    Genitourinary:  Negative.    Hematology/Lymphatics:  Negative.    Endocrine:  Negative.    Immunologic:  Negative.    Musculoskeletal:  Negative.    Integumentary:  Negative.    Neurologic:  Negative.       Health Status   Allergies:    Allergic Reactions (Selected)  Severity Not Documented  Allegra-D 12 Hour (No reactions were documented)  Penicillin V potassium (Unknown)   Medications:  (Selected)   Prescriptions  Prescribed  FLUoxetine 20 mg oral capsule: 1 cap(s) ( 20 mg ), PO, Daily, # 90 cap(s), 3 Refill(s), Type: Maintenance, Pharmacy: [facility] #[id number], 1 cap(s) po daily  Knee high compression stockings: Knee high compression stockings, 1 pair, top, daily, Instructions: dispense 2 Pair, Supply, # 2 EA, 2 Refill(s), Type: Maintenance  aspirin 325 mg oral delayed release tablet: 1 tab(s) ( 325 mg ), PO, Daily, # 30 tab(s), 0 Refill(s), Type: Maintenance, OTC (Rx)  lisinopril 20 mg oral tablet: 1 tab(s) ( 20 mg ), PO, Daily, # 90 tab(s), 3 Refill(s), Type: Maintenance, Pharmacy: [facility] #[id number], 1 tab(s) po daily  Documented Medications  Documented  C-Pap machine @ noc.: C-Pap machine @ noc., 0 Refill(s), Type: Soft Stop   Problem list:    All Problems  Depression,  Recurrent / SNOMED CT 260218985 / Confirmed  Obese / ICD-9-.00 / Probable  LISBETH (Obstructive Sleep Apnea) / ICD-9-.23 / Confirmed  Family history of kidney stone / SNOMED CT 0105862382 / Confirmed  Hypertension / SNOMED CT 6922431493 / Confirmed  History of DVT in adulthood / SNOMED CT 6580757094 / Confirmed  Inactive: History of vertigo / SNOMED CT 410118629      Histories   Past Medical History:    Active  Depression, Recurrent (133960438)  Obese (278.00)  Family history of kidney stone (7829276735)   Family History:    CA - Lung cancer  Mother  CVA - Cerebrovascular accident  Mother     Procedure history:    Bone spur (SNOMED CT [id number]) performed by Tomasa Henley DPM on 8/8/2016 at 50 Years.  Comments:  8/16/2016 9:28 AM - Sheridan Ignacio  Left hallux.  Vasectomy (SNOMED CT 19935496) performed by Lui Sweeney MD on 12/16/2005 at 39 Years.  Ventricular septal defect, repaired (SNOMED CT 919834927).   Social History:        Alcohol Assessment: Current            1-2 per wk      Tobacco Assessment: Denies Tobacco Use            Never      Substance Abuse Assessment: Denies Substance Abuse            Never      Home and Environment Assessment            Marital status: .  Spouse/Partner name: Flores.      Exercise and Physical Activity Assessment: Regular exercise       Has no history of anemia.  Has has history of DVT leftleg last year after ortho foot surgery Off eliquis for 2 months now  Has  no personal history of bleeding problems.   Has no personal or family history of anesthesia reactions.  Patient  does not have active tuberculosis.    S/he has  taken aspirin or aspirin containing products in the last week.     S/he has not taken Plavix (Clopidogrel) in the last 2 weeks.    S/he has not taken warfarin in the past week.    S/he  has not been on corticosteroids for more than 2 weeks recently.      S/he  is not DNR before, during or after surgery.    Chest  pain / SOB walking up 2 flights of steps? no  Pain in neck or jaw?no  CAD MI?  no  Afib? no  Heart Failure?no  Asthma  or Bronchitis? no  Diabetes? no       Insulin/Orals?   Seizure Disorder? no  CKD?no  Thyroid Disease?no  Liver Diseaseno  CVA?no         Physical Examination   Vital Signs   5/11/2017 9:51 AM CDT Temperature Tympanic 99.0 DegF    Peripheral Pulse Rate 73 bpm    Systolic Blood Pressure 128 mmHg    Diastolic Blood Pressure 85 mmHg    Mean Arterial Pressure 99 mmHg      General:  Alert and oriented, No acute distress.    Eye:  Pupils are equal, round and reactive to light, Extraocular movements are intact.    HENT:  Normocephalic, Tympanic membranes are clear, Normal hearing, Oral mucosa is moist.    Neck:  Supple, Non-tender, No carotid bruit, No jugular venous distention, No lymphadenopathy, No thyromegaly.    Respiratory:  Lungs are clear to auscultation, Respirations are non-labored, Breath sounds are equal.    Cardiovascular:  Normal rate, Regular rhythm, No murmur, Good pulses equal in all extremities, No edema.    Gastrointestinal:  Soft, Non-tender, Non-distended, Normal bowel sounds, No organomegaly.    Musculoskeletal:  right knee locked in flexion cms otherwise intact.    Integumentary:  Warm, Dry.    Neurologic:  Alert, Oriented, Normal sensory, Normal motor function, No focal deficits, Cranial Nerves II-XII are grossly intact, Normal deep tendon reflexes.    Psychiatric:  Cooperative, Appropriate mood & affect, Normal judgment.       Impression and Plan   Diagnosis     Locked knee (QON55-HZ M23.90).     Condition:  ok for surgery asa2, discussed with ortho.

## 2022-02-16 NOTE — NURSING NOTE
Comprehensive Intake Entered On:  12/12/2019 9:43 AM CST    Performed On:  12/12/2019 9:40 AM CST by Shayla Jamison CMA               Summary   Chief Complaint :   possible gout in the right foot started Monday    Menstrual Status :   N/A   Weight Measured :   288 lb(Converted to: 288 lb 0 oz, 130.63 kg)    Height Measured :   71.5 in(Converted to: 5 ft 11 in, 181.61 cm)    Body Mass Index :   39.6 kg/m2 (HI)    Body Surface Area :   2.56 m2   Systolic Blood Pressure :   120 mmHg   Diastolic Blood Pressure :   80 mmHg   Mean Arterial Pressure :   93 mmHg   Peripheral Pulse Rate :   74 bpm   Oxygen Saturation :   98 %   Shayla Jamison CMA - 12/12/2019 9:40 AM CST   Health Status   Allergies Verified? :   Yes   Medication History Verified? :   Yes   Immunizations Current :   Yes   Medical History Verified? :   Yes   Pre-Visit Planning Status :   Completed   Tobacco Use? :   Never smoker   Shayla Jamison CMA - 12/12/2019 9:40 AM CST   Consents   Consent for Immunization Exchange :   Consent Granted   Consent for Immunizations to Providers :   Consent Granted   Shayla Jamison CMA - 12/12/2019 9:40 AM CST   Meds / Allergies   (As Of: 12/12/2019 9:43:05 AM CST)   Allergies (Active)   Allegra-D 12 Hour  Estimated Onset Date:   Unspecified ; Created By:   Hanna Kothari; Reaction Status:   Active ; Category:   Drug ; Substance:   Allegra-D 12 Hour ; Type:   Allergy ; Updated By:   Hanna Kothari; Reviewed Date:   12/12/2019 9:41 AM CST      penicillin V potassium  Estimated Onset Date:   Unspecified ; Reactions:   unknown ; Created By:   Meche Rodríguez; Reaction Status:   Active ; Category:   Drug ; Substance:   penicillin V potassium ; Type:   Allergy ; Updated By:   Meche Rodríguez; Reviewed Date:   12/12/2019 9:41 AM CST        Medication List   (As Of: 12/12/2019 9:43:05 AM CST)   Prescription/Discharge Order    aspirin  :   aspirin ; Status:   Prescribed ; Ordered As Mnemonic:   aspirin 325 mg oral delayed release tablet  ; Simple Display Line:   325 mg, 1 tab(s), PO, Daily, 30 tab(s), 0 Refill(s) ; Ordering Provider:   Lui Sweeney MD; Catalog Code:   aspirin ; Order Dt/Tm:   3/31/2017 2:48:13 PM CDT          FLUoxetine  :   FLUoxetine ; Status:   Prescribed ; Ordered As Mnemonic:   FLUoxetine 20 mg oral capsule ; Simple Display Line:   20 mg, 1 cap(s), PO, Daily, 90 cap(s), 3 Refill(s) ; Ordering Provider:   Lui Sweeney MD; Catalog Code:   FLUoxetine ; Order Dt/Tm:   4/16/2019 3:31:39 PM CDT          lisinopril  :   lisinopril ; Status:   Prescribed ; Ordered As Mnemonic:   lisinopril 20 mg oral tablet ; Simple Display Line:   20 mg, 1 tab(s), PO, Daily, 90 tab(s), 3 Refill(s) ; Ordering Provider:   Lui Sweeney MD; Catalog Code:   lisinopril ; Order Dt/Tm:   4/16/2019 3:31:40 PM CDT          meloxicam  :   meloxicam ; Status:   Prescribed ; Ordered As Mnemonic:   meloxicam 15 mg oral tablet ; Simple Display Line:   15 mg, 1 tab(s), PO, Daily, 90 tab(s), 0 Refill(s) ; Ordering Provider:   Ede Rowland PA-C; Catalog Code:   meloxicam ; Order Dt/Tm:   11/21/2018 3:31:46 PM CST          Miscellaneous Rx Supply  :   Miscellaneous Rx Supply ; Status:   Prescribed ; Ordered As Mnemonic:   Knee high compression stockings ; Simple Display Line:   2 pair, top, daily, dispense 2 Pair, 2 EA, 2 Refill(s) ; Ordering Provider:   Lui Sweeney MD; Catalog Code:   Miscellaneous Rx Supply ; Order Dt/Tm:   4/3/2018 3:42:44 PM CDT            Home Meds    Miscellaneous Rx Supply  :   Miscellaneous Rx Supply ; Status:   Documented ; Ordered As Mnemonic:   C-Pap machine @ noc. ; Catalog Code:   Miscellaneous Rx Supply ; Order Dt/Tm:   6/22/2012 11:09:38 AM CDT

## 2022-02-16 NOTE — PROGRESS NOTES
Patient:   DIONTE CUNNINGHAM            MRN: [id number]            FIN: [id number]               Age:   53 years     Sex:  Male     :  1965   Associated Diagnoses:   Right foot pain   Author:   Ede Rowland PA-C      Report Summary   Diagnosis  Right foot pain ([id number] M79.671).  Patient InstructionsOrders   Visit Information   Visit type:  General concerns.    Accompanied by:  No one.    Source of history:  Self.    Referral source:  Self.    History limitation:  None.       Chief Complaint   2019 2:44 PM CST   Follow up Gout, meds not working        Interval History   Gout   The course is worsening.  Associated symptoms characterized by joint redness and joint warmth.  Right first MTP. Has flares from time to time. Ran out of Meloxicam. No injury. Needs new RX. CC above noted and confirmed with the patient. History of infrequent gout attacks. history of elevated uric acid level. Never used allopurinol..  Last day of prednisone today. Minimal improvement..        Review of Systems   Constitutional:  Negative.    Musculoskeletal:  Negative except as documented in history of present illness.    Integumentary:  Negative except as documented in history of present illness.       Health Status   Allergies:    Allergic Reactions (All)  Severity Not Documented  Allegra-D 12 Hour (No reactions were documented)  Penicillin V potassium (Unknown)   Medications:  (Selected)   Prescriptions  Prescribed  Colcrys 0.6 mg oral tablet: = 1 tab(s) ( 0.6 mg ), Oral, bid, # 14 tab(s), 0 Refill(s), Type: Maintenance, Pharmacy: [facility] #[id number], 1 tab(s) Oral bid,x7 day(s)  FLUoxetine 20 mg oral capsule: = 1 cap(s) ( 20 mg ), PO, Daily, # 90 cap(s), 3 Refill(s), Type: Maintenance, Pharmacy: [id number], 1 cap(s) Oral daily  Knee high compression stockings: Knee high compression stockings, 2 pair, top, daily, Instructions: dispense 2 Pair, Supply, # 2 EA, 2 Refill(s), Type: Maintenance  allopurinol  300 mg oral tablet: = 1 tab(s) ( 300 mg ), Oral, daily, # 90 tab(s), 3 Refill(s), Type: Maintenance, Pharmacy: [facility] #[id number], 1 tab(s) Oral daily  aspirin 325 mg oral delayed release tablet: 1 tab(s) ( 325 mg ), PO, Daily, # 30 tab(s), 0 Refill(s), Type: Maintenance, OTC (Rx)  lisinopril 20 mg oral tablet: = 1 tab(s) ( 20 mg ), PO, Daily, # 90 tab(s), 3 Refill(s), Type: Maintenance, Pharmacy: [id number], 1 tab(s) Oral daily  meloxicam 15 mg oral tablet: = 1 tab(s) ( 15 mg ), PO, Daily, # 90 tab(s), 0 Refill(s), Type: Maintenance, Pharmacy: [facility] #[id number], 1 tab(s) Oral daily  Documented Medications  Documented  C-Pap machine @ noc.: C-Pap machine @ noc., 0 Refill(s), Type: Soft Stop   Problem list:    All Problems  LISBETH (Obstructive Sleep Apnea) / ICD-9-.23 / Confirmed  Obese / ICD-9-.00 / Probable  Hypertension / SNOMED CT 1383943890 / Confirmed  History of DVT in adulthood / SNOMED CT 0591072450 / Confirmed  Family history of kidney stone / SNOMED CT 3500928731 / Confirmed  Depression, Recurrent / SNOMED CT 811500993 / Confirmed  Inactive: History of vertigo / SNOMED CT 702231811      Histories   Past Medical History:    Active  Depression, Recurrent (597336652)  Obese (278.00)  Family history of kidney stone (4503183521)   Family History:    CA - Lung cancer  Mother  CVA - Cerebrovascular accident  Mother     Procedure history:    Bone spur ([id number]) on 8/8/2016 at 50 Years.  Comments:  8/16/2016 9:28 AM CDT - Sheridan Ignacio  Left hallux.  Vasectomy (13841583) on 12/16/2005 at 39 Years.  Ventricular septal defect, repaired (494504071).      Physical Examination   Vital Signs   12/18/2019 2:44 PM CST Temperature Tympanic 97.8 DegF  LOW    Peripheral Pulse Rate 80 bpm    Pulse Site Radial artery    HR Method Manual    Systolic Blood Pressure 126 mmHg    Diastolic Blood Pressure 70 mmHg    Mean Arterial Pressure 89 mmHg    BP Site  Right arm    BP Method Manual      Measurements from flowsheet : Measurements   12/18/2019 2:44 PM CST Height Measured - Standard 71.5 in    Weight Measured - Standard 288 lb    BSA 2.56 m2    Body Mass Index 39.6 kg/m2  HI      Respiratory:  Respirations are non-labored.    Cardiovascular:  Normal rate.         Arterial pulses: Right, Dorsalis pedis, 2+.    Musculoskeletal:  Right first MTP laterally is swollen, erythematous and warm to touch. Pedal pulse intact. Skin intact..       Impression and Plan   Diagnosis     Right foot pain (OQV82-WS M79.671).     Patient Instructions:       Counseled: Patient, Regarding diagnosis, Regarding medications, Activity, Verbalized understanding.    Orders     Orders (Selected)   Outpatient Orders  Ordered  CBC w/o Diff (Request): Priority: Urgent, Right foot pain  Prescriptions  Prescribed  Colcrys 0.6 mg oral tablet: = 1 tab(s) ( 0.6 mg ), Oral, bid, # 14 tab(s), 0 Refill(s), Type: Maintenance, Pharmacy: [email or web address] #[id number], 1 tab(s) Oral bid,x7 day(s).     FU if not gradually improved, or if worsening.

## 2022-02-16 NOTE — NURSING NOTE
Comprehensive Intake Entered On:  4/26/2021 10:53 AM CDT    Performed On:  4/26/2021 10:40 AM CDT by Danae Gracia               Summary   Temperature Tympanic :   97.3 DegF(Converted to: 36.3 DegC)  (LOW)    Danae Gracia - 4/26/2021 11:00 AM CDT   Chief Complaint :   Allergy since last thursday. Taken Benydrl and Claritin.   Menstrual Status :   N/A   Weight Measured :   297.5 lb(Converted to: 297 lb 8 oz, 134.944 kg)    Height Measured :   71.5 in(Converted to: 5 ft 11 in, 181.61 cm)    Body Mass Index :   40.91 kg/m2 (HI)    Body Surface Area :   2.61 m2   Systolic Blood Pressure :   122 mmHg   Diastolic Blood Pressure :   78 mmHg   Mean Arterial Pressure :   93 mmHg   Peripheral Pulse Rate :   72 bpm   BP Site :   Right arm   Pulse Site :   Radial artery   BP Method :   Manual   HR Method :   Manual   Respiratory Rate :   14 br/min   Oxygen Saturation :   97 %   Danae Gracia - 4/26/2021 10:40 AM CDT   Health Status   Allergies Verified? :   Yes   Medication History Verified? :   Yes   Immunizations Current :   Yes   Medical History Verified? :   No   Pre-Visit Planning Status :   Completed   Danae Gracia - 4/26/2021 10:40 AM CDT   Consents   Consent for Immunization Exchange :   Consent Granted   Consent for Immunizations to Providers :   Consent Granted   Danae Gracia - 4/26/2021 10:40 AM CDT   Meds / Allergies   (As Of: 4/26/2021 10:53:16 AM CDT)   Allergies (Active)   Allegra-D 12 Hour  Estimated Onset Date:   Unspecified ; Created By:   Hanna Kothari; Reaction Status:   Active ; Category:   Drug ; Substance:   Allegra-D 12 Hour ; Type:   Allergy ; Updated By:   Hanna Kothari; Reviewed Date:   11/4/2020 3:36 PM CST      penicillin V potassium  Estimated Onset Date:   Unspecified ; Reactions:   unknown ; Created By:   Meche Rodríguez; Reaction Status:   Active ; Category:   Drug ; Substance:   penicillin V potassium ; Type:   Allergy ; Updated By:   Meche Rodríguez; Reviewed Date:   11/4/2020 3:36 PM  CST        Medication List   (As Of: 4/26/2021 10:53:16 AM CDT)   Prescription/Discharge Order    Miscellaneous Rx Supply  :   Miscellaneous Rx Supply ; Status:   Prescribed ; Ordered As Mnemonic:   Knee high compression stockings ; Simple Display Line:   2 pair, top, daily, dispense 2 Pair, 2 EA, 2 Refill(s) ; Ordering Provider:   Lui Sweeney MD; Catalog Code:   Miscellaneous Rx Supply ; Order Dt/Tm:   4/3/2018 3:42:44 PM CDT          lisinopril  :   lisinopril ; Status:   Prescribed ; Ordered As Mnemonic:   lisinopril 20 mg oral tablet ; Simple Display Line:   1 tab(s), Oral, daily, 90 tab(s), 3 Refill(s) ; Ordering Provider:   Lui Sweeney MD; Catalog Code:   lisinopril ; Order Dt/Tm:   6/4/2020 8:15:20 AM CDT          FLUoxetine  :   FLUoxetine ; Status:   Prescribed ; Ordered As Mnemonic:   FLUoxetine 20 mg oral capsule ; Simple Display Line:   1 cap(s), Oral, daily, 90 cap(s), 3 Refill(s) ; Ordering Provider:   Lui Sweeney MD; Catalog Code:   FLUoxetine ; Order Dt/Tm:   6/4/2020 8:15:21 AM CDT          colchicine  :   colchicine ; Status:   Prescribed ; Ordered As Mnemonic:   Colcrys 0.6 mg oral tablet ; Simple Display Line:   0.6 mg, 1 tab(s), Oral, bid, for 7 day(s), 14 tab(s), 0 Refill(s) ; Ordering Provider:   Eriberto Dc PA-C; Catalog Code:   colchicine ; Order Dt/Tm:   11/4/2020 3:46:11 PM CST          aspirin  :   aspirin ; Status:   Prescribed ; Ordered As Mnemonic:   aspirin 325 mg oral delayed release tablet ; Simple Display Line:   325 mg, 1 tab(s), PO, Daily, 30 tab(s), 0 Refill(s) ; Ordering Provider:   Lui Sweeney MD; Catalog Code:   aspirin ; Order Dt/Tm:   3/31/2017 2:48:13 PM CDT          allopurinol  :   allopurinol ; Status:   Prescribed ; Ordered As Mnemonic:   allopurinol 300 mg oral tablet ; Simple Display Line:   300 mg, 1 tab(s), Oral, daily, 90 tab(s), 3 Refill(s) ; Ordering Provider:   Lui Sweeney MD; Catalog Code:   allopurinol ;  Order Dt/Tm:   6/4/2020 8:15:19 AM CDT            Home Meds    Miscellaneous Rx Supply  :   Miscellaneous Rx Supply ; Status:   Documented ; Ordered As Mnemonic:   C-Pap machine @ noc. ; Catalog Code:   Miscellaneous Rx Supply ; Order Dt/Tm:   6/22/2012 11:09:38 AM CDT            ID Risk Screen   Recent Travel History :   No recent travel   Family Member Travel History :   No recent travel   Other Exposure to Infectious Disease :   Unknown   COVID-19 Testing Status :   Positive COVID-19 test greater than 30 days ago   Danae Gracia - 4/26/2021 10:40 AM CDT   Social History   Social History   (As Of: 4/26/2021 10:53:16 AM CDT)   Alcohol:  Current      1-2 per wk   (Last Updated: 8/30/2010 10:37:03 AM CDT by Sheridan Ignacio)          Tobacco:  Denies Tobacco Use      Never (less than 100 in lifetime)   (Last Updated: 4/26/2021 10:41:17 AM CDT by Danae Gracia)          Electronic Cigarette/Vaping:        Electronic Cigarette Use: Never.   (Last Updated: 4/26/2021 10:41:22 AM CDT by Danae Gracia)          Substance Abuse:  Denies Substance Abuse      Never   (Last Updated: 10/26/2011 8:27:53 AM CDT by Sheridan Ignacoi)          Home/Environment:        Marital status: .  Spouse/Partner name: Flores.   (Last Updated: 10/26/2011 8:27:29 AM CDT by Sheridan Ignacio)          Exercise:  Regular exercise      (Last Updated: 8/30/2010 10:39:51 AM CDT by Sheridan Ignacio )

## 2022-02-16 NOTE — TELEPHONE ENCOUNTER
---------------------  From: Becca Hernandez LPN (Phone Messages Pool ([id number]_[facility]))   To: Advanced Practice Provider Pool ([address]_[facility]);     Sent: 11/4/2020 8:58:44 AM CST  Subject: CONSUMER MESSAGE FW: Gout     KAH out of clinic      ---------------------  From: DIONTE CUNNINGHAM  [facility]  Sent: 11/04/2020 08:53 a.m. CST  Subject: Gout  Ede I have had gout pain in right foot for 7 days. My knee is feeling  a lot better. I have tried all meds but gout has strong hold in foot. Could you please send prescription for steroid treatment to get rid of gout pain? I am going out of town in 8 days and it would be nice to be able to walk pain free.---------------------  From: Brittny Gould PA-C (Advanced Practice Provider Pool ([address]224_[facility]))   To: Phone [employer] Pool ([address]_[place]);     Sent: 11/4/2020 9:21:18 AM CST  Subject: RE: CONSUMER MESSAGE FW: Gout     visit marjorie send new message

## 2022-02-16 NOTE — NURSING NOTE
Comprehensive Intake Entered On:  2019 3:19 PM CDT    Performed On:  2019 3:12 PM CDT by Shelley Roy MA               Summary   Chief Complaint :   htn and depression med check   Weight Measured :   287.4 lb(Converted to: 287 lb 6 oz, 130.36 kg)    Height Measured :   71.5 in(Converted to: 5 ft 11 in, 181.61 cm)    Body Mass Index :   39.52 kg/m2 (HI)    Body Surface Area :   2.56 m2   Systolic Blood Pressure :   140 mmHg (HI)    Diastolic Blood Pressure :   78 mmHg   Mean Arterial Pressure :   99 mmHg   Peripheral Pulse Rate :   58 bpm (LOW)    Temperature Tympanic :   98.3 DegF(Converted to: 36.8 DegC)    Shelley Roy MA - 2019 3:12 PM CDT   Health Status   Allergies Verified? :   Yes   Medication History Verified? :   Yes   Immunizations Current :   Yes   Medical History Verified? :   Yes   Tobacco Use? :   Never smoker   Shelley Roy MA - 2019 3:12 PM CDT   Demographics   Last Name :   OCTAVIO   Address :   [address] [place] [address]   First Name :   DIONTE Fernández Initial :   NELY   Responsible Party Date of Birth () :   1965 [facility]   City :   [place]   State :   [place]   Zip Code :   [address]   Shelley Roy MA - 2019 3:12 PM CDT   Consents   Consent for Immunization Exchange :   Consent Granted   Consent for Immunizations to Providers :   Consent Granted   Shelley Roy MA - 2019 3:12 PM CDT   Meds / Allergies   (As Of: 2019 3:19:00 PM CDT)   Allergies (Active)   Allegra-D 12 Hour  Estimated Onset Date:   Unspecified ; Created By:   Hanna Kothari; Reaction Status:   Active ; Category:   Drug ; Substance:   Allegra-D 12 Hour ; Type:   Allergy ; Updated By:   Hanna Kothari; Reviewed Date:   2019 3:14 PM CDT      penicillin V potassium  Estimated Onset Date:   Unspecified ; Reactions:   unknown ; Created By:   Meche Rodríguez; Reaction Status:   Active ; Category:   Drug ; Substance:   penicillin V potassium ; Type:   Allergy ; Updated By:   Meche Rodríguez; Reviewed  Date:   4/16/2019 3:14 PM CDT        Medication List   (As Of: 4/16/2019 3:19:00 PM CDT)   Prescription/Discharge Order    meloxicam  :   meloxicam ; Status:   Prescribed ; Ordered As Mnemonic:   meloxicam 15 mg oral tablet ; Simple Display Line:   15 mg, 1 tab(s), PO, Daily, 90 tab(s), 0 Refill(s) ; Ordering Provider:   Ede Rowland PA-C; Catalog Code:   meloxicam ; Order Dt/Tm:   11/21/2018 3:31:46 PM          predniSONE  :   predniSONE ; Status:   Processing ; Ordered As Mnemonic:   predniSONE 20 mg oral tablet ; Ordering Provider:   Ede Rowland PA-C; Action Display:   Complete ; Catalog Code:   predniSONE ; Order Dt/Tm:   4/16/2019 3:15:18 PM          Miscellaneous Rx Supply  :   Miscellaneous Rx Supply ; Status:   Prescribed ; Ordered As Mnemonic:   Knee high compression stockings ; Simple Display Line:   2 pair, top, daily, dispense 2 Pair, 2 EA, 2 Refill(s) ; Ordering Provider:   Lui Sweeney MD; Catalog Code:   Miscellaneous Rx Supply ; Order Dt/Tm:   4/3/2018 3:42:44 PM          FLUoxetine  :   FLUoxetine ; Status:   Prescribed ; Ordered As Mnemonic:   FLUoxetine 20 mg oral capsule ; Simple Display Line:   20 mg, 1 cap(s), PO, Daily, 90 cap(s), 3 Refill(s) ; Ordering Provider:   Lui Sweeney MD; Catalog Code:   FLUoxetine ; Order Dt/Tm:   4/3/2018 3:37:28 PM          lisinopril  :   lisinopril ; Status:   Prescribed ; Ordered As Mnemonic:   lisinopril 20 mg oral tablet ; Simple Display Line:   20 mg, 1 tab(s), PO, Daily, 90 tab(s), 3 Refill(s) ; Ordering Provider:   Lui Sweeney MD; Catalog Code:   lisinopril ; Order Dt/Tm:   4/3/2018 3:37:26 PM          aspirin  :   aspirin ; Status:   Prescribed ; Ordered As Mnemonic:   aspirin 325 mg oral delayed release tablet ; Simple Display Line:   325 mg, 1 tab(s), PO, Daily, 30 tab(s), 0 Refill(s) ; Ordering Provider:   Lui Sweeney MD; Catalog Code:   aspirin ; Order Dt/Tm:   3/31/2017 2:48:13 PM            [facility]     Miscellaneous Rx Supply  :   Miscellaneous Rx Supply ; Status:   Documented ; Ordered As Mnemonic:   C-Pap machine @ noc. ; Catalog Code:   Miscellaneous Rx Supply ; Order Dt/Tm:   6/22/2012 11:09:38 AM

## 2022-02-16 NOTE — NURSING NOTE
Depression Screening Entered On:  4/16/2019 3:49 PM CDT    Performed On:  4/16/2019 3:49 PM CDT by Isatu Singletary CMA               Depression Screening   Little Interest - Pleasure in Activities :   Not at all   Feeling Down, Depressed, Hopeless :   Not at all   Initial Depression Screen Score :   0    Trouble Falling or Staying Asleep :   Several days   Feeling Tired or Little Energy :   Several days   Poor Appetite or Overeating :   Not at all   Feeling Bad About Yourself :   Not at all   Trouble Concentrating :   Not at all   Moving or Speaking Slowly :   Not at all   Thoughts Better Off Dead or Hurting Self :   Not at all   Detailed Depression Screen Score :   2    Total Depression Screen Score :   2    LEN Difficulty with Work, Home, Others :   Not difficult at all   Isatu Singletary CMA - 4/16/2019 3:49 PM CDT

## 2022-02-16 NOTE — TELEPHONE ENCOUNTER
---------------------  From: Ede Rowland PA-C   To: DIONTE CUNNINGHAM    Sent: 6/24/2020 9:06:16 AM CDT  Subject: Patient Message - Results Notification        These are all normal.    Results:  Date Result Name Value Ref Range   6/23/2020 3:46 PM Sodium Level 139 mmol/L (135 - 146)   6/23/2020 3:46 PM Potassium Level 4.5 mmol/L (3.5 - 5.3)   6/23/2020 3:46 PM Chloride Level 105 mmol/L (98 - 110)   6/23/2020 3:46 PM CO2 Level 26 mmol/L (20 - 32)   6/23/2020 3:46 PM Glucose Level 81 mg/dL (65 - 99)   6/23/2020 3:46 PM BUN 25 mg/dL (7 - 25)   6/23/2020 3:46 PM Creatinine Level 1.31 mg/dL (0.70 - 1.33)   6/23/2020 3:46 PM Calcium Level 9.9 mg/dL (8.6 - 10.3)

## 2022-02-16 NOTE — PROGRESS NOTES
Patient:   DIONTE CUNNINGHAM            MRN: [id number]            FIN: [id number]               Age:   54 years     Sex:  Male     :  1965   Associated Diagnoses:   Family history of kidney stone   Author:   Lui Sweeney MD      Visit Information      Date of Service: 2020 10:29 am  Performing Location: [facility]  Encounter#: [id number]      Primary Care Provider (PCP):  Lui Sweeney MD    NPI# [id number]      Referring Provider:  Lui Sweeney MD, NPI# [id number]   Visit type:  Telephone Encounter.    Source of history:  Patient.    Location of patient:  Home  Call Start Time:   1345  Call End Time:    1400      Chief Complaint   2020 1:34 PM CDT     Follow up labs. Verbal consent granted for telemedicine visit.     _      History of Present Illness   Today's visit was conducted via telephone due to the COVID-19 pandemic. Patient's consent to telephone visit was obtained and documented.      Reason for visit:  Discussed reduced renal function.  Will repeat nonfasting off meloxicam.        Review of Systems   Constitutional:  Negative.    Respiratory:  Negative.    Cardiovascular:  Negative.       Impression and Plan   Diagnosis     Family history of kidney stone (YVF85-VP Z84.1).     Course:  Improving, Progressing as expected.    Orders     Orders (Selected)   Outpatient Orders  Future (On Hold)  Basic Metabolic Panel* ([facility]): Specimen Type: Serum, *Est. Collection Date: 20 +/- 2 day(s), Future Order  Prescriptions  Prescribed  Colcrys 0.6 mg oral tablet: = 1 tab(s) ( 0.6 mg ), Oral, bid, # 14 tab(s), 1 Refill(s), Type: Maintenance, Pharmacy: [facility] #[id number], Patient will call., 1 tab(s) Oral bid,x7 day(s), 71.5, in, 20 13:34:00 CDT, Height Measured, 285.6, lb, 20 15:11:00 CDT,....     Orders     F/U in 48-72 hours if not improving, sooner if getting worse.        Health Status   Allergies:    Allergic Reactions  (Selected)  Severity Not Documented  Allegra-D 12 Hour (No reactions were documented)  Penicillin V potassium (Unknown)   Medications:  (Selected)   Prescriptions  Prescribed  Colcrys 0.6 mg oral tablet: = 1 tab(s) ( 0.6 mg ), Oral, bid, # 14 tab(s), 1 Refill(s), Type: Maintenance, Pharmacy: [facility] #[id number], Patient will call., 1 tab(s) Oral bid,x7 day(s), 71.5, in, 06/09/20 13:34:00 CDT, Height Measured, 285.6, lb, 06/02/20 15:11:00 CDT,...  FLUoxetine 20 mg oral capsule: = 1 cap(s), Oral, daily, # 90 cap(s), 3 Refill(s), Type: Maintenance, Pharmacy: [facility] #[id number], Due for labs and appt prior to next refill, 1 cap(s) Oral daily, 71.5, in, 06/02/20 15:11:00 CDT, Height Measured, 285.6, lb, 06/02/20 15:11:0...  Knee high compression stockings: Knee high compression stockings, 2 pair, top, daily, Instructions: dispense 2 Pair, Supply, # 2 EA, 2 Refill(s), Type: Maintenance  allopurinol 300 mg oral tablet: = 1 tab(s) ( 300 mg ), Oral, daily, # 90 tab(s), 3 Refill(s), Type: Maintenance, Pharmacy: [facility] #[id number], 1 tab(s) Oral daily, 71.5, in, 06/02/20 15:11:00 CDT, Height Measured, 285.6, lb, 06/02/20 15:11:00 CDT, Weight Measured  aspirin 325 mg oral delayed release tablet: 1 tab(s) ( 325 mg ), PO, Daily, # 30 tab(s), 0 Refill(s), Type: Maintenance, OTC (Rx)  lisinopril 20 mg oral tablet: = 1 tab(s), Oral, daily, # 90 tab(s), 3 Refill(s), Type: Maintenance, Pharmacy: [facility] #[id number], Due for labs and appt prior to next refill, 1 tab(s) Oral daily, 71.5, in, 06/02/20 15:11:00 CDT, Height Measured, 285.6, lb, 06/02/20 15:11:0...  Documented Medications  Documented  C-Pap machine @ noc.: C-Pap machine @ noc., 0 Refill(s), Type: Soft Stop   Problem list:    All Problems  Hypertension / SNOMED CT 6944094300 / Confirmed  History of DVT in adulthood / SNOMED CT 1260936588 / Confirmed  Family history of kidney stone / SNOMED CT 1091327243 / Confirmed  Obese / ICD-9-.00 /  Probable  Depression, Recurrent / SNOMED CT 890901146 / Confirmed  LISBETH (Obstructive Sleep Apnea) / ICD-9-.23 / Confirmed      Histories   Past Medical History:    Active  Depression, Recurrent (SNOMED CT 818443332)  Obese (ICD-9-.00)  Family history of kidney stone (SNOMED CT 8891092149)   Family History:    CA - Lung cancer  Mother  CVA - Cerebrovascular accident  Mother     Procedure history:    Bone spur (SNOMED CT [id number]) performed by Tomasa Henley DPM on 8/8/2016 at 50 Years.  Comments:  8/16/2016 9:28 AM CDT - Sheridan Ignacio  Left hallux.  Vasectomy (SNOMED CT 76708196) performed by Lui Sweeney MD on 12/16/2005 at 39 Years.  Ventricular septal defect, repaired (SNOMED CT 146191003).   Social History:        Alcohol Assessment: Current            1-2 per wk      Tobacco Assessment: Denies Tobacco Use            Never      Substance Abuse Assessment: Denies Substance Abuse            Never      Home and Environment Assessment            Marital status: .  Spouse/Partner name: Flores.      Exercise and Physical Activity Assessment: Regular exercise        Physical Examination   Measurements from flowsheet : Measurements   6/9/2020 1:34 PM CDT     Height Measured - Standard                71.5 in        Review / Management   Results review:  Lab results   6/2/2020 3:11 PM CDT Sodium Level 141 mmol/L    Potassium Level 4.8 mmol/L    Chloride Level 110 mmol/L    CO2 Level 25 mmol/L    Glucose Level 76 mg/dL    BUN 25 mg/dL    Creatinine 1.82 mg/dL  HI    BUN/Creat Ratio 14    eGFR 41 mL/min/1.73m2  LOW    eGFR African American 48 mL/min/1.73m2  LOW    Calcium Level 9.6 mg/dL    WBC 5.9    RBC 4.23    Hgb 12.5 gm/dL  LOW    Hct 36.8 %  LOW    MCV 87.0 fL    MCH 29.6 pg    MCHC 34.0 gm/dL    RDW 13.1 %    Platelet 242    MPV 10.4 fL   .

## 2022-02-16 NOTE — TELEPHONE ENCOUNTER
---------------------  From: Becca Hernandez LPN (Phone Messages Pool ([id number]_[facility]))   To: DIONTE CUNNINGHAM    Sent: 11/4/2020 9:35:01 AM CST  Subject: General Message     Rasta Polo    Ede is out of clinic until 11/9. The covering provider said you would need a visit for your gout pain.    Thanks,  Becca COOK LPN

## 2022-02-16 NOTE — LETTER
(Inserted Image. Unable to display)   May 06, 2019      DIONTE CUNNINGHAM  [address] [place] [id number]        Dear DIONTE,      At a previous visit your provider recommended you have a colonoscopy.  At this visit your provider assessed your health history to determine the proper anesthetic for your procedure.  Your provider then completed an order for your colonoscopy which will  within 30 or 90 days from the time of your previous visit depending on the type of anesthetic you are to receive.      We are requesting you call us at [phone] to set this up.  Please note, it may take a few  days for someone to get back to you.  If you do not contact us within two weeks, we will assume you are not interested in having a colonoscopy at this time.    If you do not set up your colonoscopy during this time frame, you will need to have another exam with your physician.  This exam can be combined with any scheduled appointment with your primary physician (physicals, medication checks, etc.)           Sincerely,    [facility]  Referral Office

## 2022-02-16 NOTE — TELEPHONE ENCOUNTER
Called with MRI report.  Will use ice, elevation and NSAIDS. Offered PT, he defers for now. May try brace. FU if not gradually improved.    KAH

## 2022-02-16 NOTE — TELEPHONE ENCOUNTER
---------------------  From: Ede Rowland PA-C   To: DIONTE CUNNINGHAM    Sent: 12/19/2019 6:25:00 AM CST  Subject: Patient Message - Results Notification        No sign of infection    Results:  Date Result Name Ind Value Ref Range   12/18/2019 3:10 PM WBC  9.7 (4.5 - 11.0)   12/18/2019 3:10 PM RBC  4.54 (4.30 - 5.90)   12/18/2019 3:10 PM Hgb ((L)) 13.4 g/dL (13.5 - 17.5)   12/18/2019 3:10 PM Hct  40.5 % (37.0 - 53.0)   12/18/2019 3:10 PM MCV  89 fL (80 - 100)   12/18/2019 3:10 PM MCH  29.5 pg (26.0 - 34.0)   12/18/2019 3:10 PM MCHC  33.1 g/dL (32.0 - 36.0)   12/18/2019 3:10 PM RDW  13.8 % (11.5 - 15.5)   12/18/2019 3:10 PM Platelet  299 (140 - 440)   12/18/2019 3:10 PM MPV  10.7 fL (6.5 - 11.0)

## 2022-02-16 NOTE — PROGRESS NOTES
Patient:   DIONTE CUNNINGHAM            MRN: [id number]            FIN: [id number]               Age:   51 years     Sex:  Male     :  1965   Associated Diagnoses:   Hypertension; Depression, Recurrent; History of DVT in adulthood   Author:   Lui Sweeney MD      Visit Information      Date of Service: 2017 02:19 pm  Performing Location: [facility]  Encounter#: [id number]      Primary Care Provider (PCP):  Lui Sweeney MD    NPI# [id number]      Referring Provider:  No referring provider recorded for selected visit.      Chief Complaint   3/31/2017 2:20 PM CDT    HTN med check     Chief complaint and symptoms noted above confirmed with patient.      History of Present Illness             The patient presents for evaluation of hypertension.  The hypertension  is characterized by no symptoms.  The severity of the hypertension symptom(s) is moderate.  The timing/course of hypertension symptom(s) is constant.  The elevated blood pressure was first identified during a routine physical exam, was first identified during a visit to provider for illness, was first identified during a health fair, was first identified at a local drug store technique: via automated technique.  The patient's blood pressure was confirmed with repeated measures.  Exacerbating factors consist of none.  Relieving factors consist of none.  Prior treatment consists of none and OTC Chiropractic.        Review of Systems   Constitutional:  Negative.    Respiratory:  Negative.    Cardiovascular:  Negative.    Gastrointestinal:  Negative.    Musculoskeletal:  Negative.    Neurologic:  Negative.    Psychiatric:  Negative.       Health Status   Allergies:    Allergic Reactions (Selected)  Severity Not Documented  Allegra-D 12 Hour (No reactions were documented)  Penicillin V potassium (Unknown)   Medications:  (Selected)   Prescriptions  Prescribed  FLUoxetine 20 mg oral capsule: 1 cap(s) ( 20 mg ),  PO, Daily, # 90 cap(s), 3 Refill(s), Type: Maintenance, Pharmacy: [facility] #[id number], 1 cap(s) po daily  Knee high compression stockings: Knee high compression stockings, 1 pair, top, daily, Instructions: dispense 2 Pair, Supply, # 2 EA, 2 Refill(s), Type: Maintenance  lisinopril 20 mg oral tablet: 1 tab(s) ( 20 mg ), PO, Daily, # 90 tab(s), 3 Refill(s), Type: Maintenance, Pharmacy: [facility] #[id number], 1 tab(s) po daily  Documented Medications  Documented  C-Pap machine @ noc.: C-Pap machine @ noc., 0 Refill(s), Type: Soft Stop   Problem list:    All Problems (Selected)  Family history of kidney stone / SNOMED CT 3301314504 / Confirmed  Hypertension / SNOMED CT 3637364833 / Confirmed  Obese / ICD-9-.00 / Probable  LISBETH (Obstructive Sleep Apnea) / ICD-9-.23 / Confirmed  Depression, Recurrent / SNOMED CT 862566133 / Confirmed      Histories   Past Medical History:    Active  Depression, Recurrent (SNOMED CT 812356196)  Obese (ICD-9-.00)  Family history of kidney stone (SNOMED CT 0400266345)   Family History:    CA - Lung cancer  Mother  CVA - Cerebrovascular accident  Mother     Procedure history:    Bone spur (SNOMED CT [id number]) performed by Tomasa Henley DPM on 8/8/2016 at 50 Years.  Comments:  8/16/2016 9:28 AM - Sheridan Ignacio  Left hallux.  Vasectomy (SNOMED CT 73039895) performed by Lui Sweeney MD on 12/16/2005 at 39 Years.  Ventricular septal defect, repaired (SNOMED CT 506318987).   Social History:        Alcohol Assessment: Current            1-2 per wk      Tobacco Assessment: Denies Tobacco Use            Never      Substance Abuse Assessment: Denies Substance Abuse            Never      Home and Environment Assessment            Marital status: .  Spouse/Partner name: Flores.      Exercise and Physical Activity Assessment: Regular exercise        Physical Examination   Vital Signs   3/31/2017 2:20 PM CDT Temperature Temporal 97.6 DegF     Peripheral Pulse Rate 76 bpm    Pulse Site Radial artery    Systolic Blood Pressure 116 mmHg    Diastolic Blood Pressure 78 mmHg    Mean Arterial Pressure 91 mmHg    BP Site Left arm    BP Method Manual      Measurements from flowsheet : Measurements   3/31/2017 2:20 PM CDT Height Measured - Standard 71.5 in    Weight Measured - Standard 274.6 lb    BSA 2.5 m2    Body Mass Index 37.76 kg/m2      General:  Alert and oriented, No acute distress.    Eye:  Normal conjunctiva.    HENT:  Normocephalic, Tympanic membranes are clear.    Neck:  Supple, Non-tender.    Respiratory:  Lungs are clear to auscultation, Respirations are non-labored, Breath sounds are equal.    Cardiovascular:  Normal rate, Regular rhythm, No murmur, No gallop.    Gastrointestinal:  Soft, Non-tender, Non-distended, Normal bowel sounds.    Musculoskeletal:       Lower extremity exam: Lower leg ( No tenderness ).    Integumentary:  Warm, Dry, Pink.    Neurologic:  Alert, Oriented, No focal deficits.    Psychiatric:  Cooperative, Appropriate mood & affect.       Impression and Plan   Diagnosis     Hypertension (RNM15-WS I10).     Plan:       Diet: Low cholesterol, Low fat, Sodium restricted.    Patient Instructions:       Counseled: Patient, Regarding diagnosis, Regarding treatment, Regarding medications, Diet, Activity, Verbalized understanding.    Orders   Diagnosis     Depression, Recurrent (WOG38-NQ F33.42).     Diagnosis     History of DVT in adulthood (CJU98-OX Z86.718).     Orders     Orders   Pharmacy:  aspirin 325 mg oral delayed release tablet (Prescribe): 1 tab(s) ( 325 mg ), PO, Daily, # 30 tab(s), 0 Refill(s), Type: Maintenance, OTC (Rx).

## 2022-02-16 NOTE — PROGRESS NOTES
Patient:   DIONTE CUNNINGHAM            MRN: [id number]            FIN: [id number]               Age:   54 years     Sex:  Male     :  1965   Associated Diagnoses:   Gout   Author:   Eriberto Dc PA-C      Chief Complaint   2020 3:28 PM CST    Pt here for painful,red and swollen Right foot x 7 days.        Interval History   Chief complaint and symptoms noted above and confirmed with patient   gout flare of right foot for 7 days  has taken colchicine for 4 days without relief, last time he had a flare that was unresponsive to colchicine he responded to course of prednisone         Review of Systems   Constitutional:  Negative.    Ear/Nose/Mouth/Throat:  Negative.    Respiratory:  Negative.    Gastrointestinal:  Negative.       Health Status   Allergies:    Allergic Reactions (Selected)  Severity Not Documented  Allegra-D 12 Hour (No reactions were documented)  Penicillin V potassium (Unknown)   Medications:  (Selected)   Prescriptions  Prescribed  FLUoxetine 20 mg oral capsule: = 1 cap(s), Oral, daily, # 90 cap(s), 3 Refill(s), Type: Maintenance, Pharmacy: [facility] #[id number], Due for labs and appt prior to next refill, 1 cap(s) Oral daily, 71.5, in, 20 15:11:00 CDT, Height Measured, 285.6, lb, 20 15:11:0...  Knee high compression stockings: Knee high compression stockings, 2 pair, top, daily, Instructions: dispense 2 Pair, Supply, # 2 EA, 2 Refill(s), Type: Maintenance  allopurinol 300 mg oral tablet: = 1 tab(s) ( 300 mg ), Oral, daily, # 90 tab(s), 3 Refill(s), Type: Maintenance, Pharmacy: [facility] #[id number], 1 tab(s) Oral daily, 71.5, in, 20 15:11:00 CDT, Height Measured, 285.6, lb, 20 15:11:00 CDT, Weight Measured  aspirin 325 mg oral delayed release tablet: 1 tab(s) ( 325 mg ), PO, Daily, # 30 tab(s), 0 Refill(s), Type: Maintenance, OTC (Rx)  lisinopril 20 mg oral tablet: = 1 tab(s), Oral, daily, # 90 tab(s), 3 Refill(s), Type: Maintenance, Pharmacy:  [facility][id number], Due for labs and appt prior to next refill, 1 tab(s) Oral daily, 71.5, in, 06/02/20 15:11:00 CDT, Height Measured, 285.6, lb, 06/02/20 15:11:0...  Documented Medications  Documented  C-Pap machine @ noc.: C-Pap machine @ noc., 0 Refill(s), Type: Soft Stop   Problem list:    All Problems (Selected)  Hypertension / SNOMED CT 4415100272 / Confirmed  History of DVT in adulthood / SNOMED CT 8821283070 / Confirmed  Family history of kidney stone / SNOMED CT 8446484764 / Confirmed  Obese / ICD-9-.00 / Probable  Depression, Recurrent / SNOMED CT 627787580 / Confirmed  LISBETH (Obstructive Sleep Apnea) / ICD-9-.23 / Confirmed      Histories   Past Medical History:    Active  Depression, Recurrent (671367893)  Obese (278.00)  Family history of kidney stone (7818874453)   Family History:    CA - Lung cancer  Mother  CVA - Cerebrovascular accident  Mother     Procedure history:    Bone spur ([id number]) on 8/8/2016 at 50 Years.  Comments:  8/16/2016 9:28 AM CDT - [facility] Ignacio  Left hallux.  Vasectomy ([id number]) on 12/16/2005 at 39 Years.  Ventricular septal defect, repaired ([id number]).      Physical Examination   Vital Signs   11/4/2020 3:28 PM CST Temperature Tympanic 97.5 DegF  LOW    Peripheral Pulse Rate 64 bpm    Pulse Site Radial artery    Respiratory Rate 16 br/min    Systolic Blood Pressure 118 mmHg    Diastolic Blood Pressure 68 mmHg    Mean Arterial Pressure 85 mmHg    BP Site Right arm      Measurements from flowsheet : Measurements   11/4/2020 3:28 PM CST Height Measured - Standard 71.5 in    Weight Measured - Standard 284 lb    BSA 2.55 m2    Body Mass Index 39.05 kg/m2  HI      General:  No acute distress.    Respiratory:  Lungs are clear to auscultation.    Cardiovascular:  Normal rate, Regular rhythm, No murmur.    Musculoskeletal:  right great toe is red, inflamed, sensitive to touch; remainder of the foot is normal.       Impression and Plan    Diagnosis     Gout (CFU40-SE M10.9).     Summary:  will treat with prednisone for a week, will renew his colchicine, he will discuss restarting allopurinol with his primary providers.    Orders     Orders   Pharmacy:  predniSONE 20 mg oral tablet (Prescribe): = 2 tab(s) ( 40 mg ), Oral, daily, x 7 day(s), Instructions: with food or milk, # 14 tab(s), 1 Refill(s), Type: Acute, Pharmacy: [facility] #[id number], 2 tab(s) Oral daily,x7 day(s),Instr:with food or milk, 71.5, in, 11/4/2020 3:28 PM CST, Height Measured, 284, lb, 11/4/2020 3:28 PM CST, Weight Measured  Colcrys 0.6 mg oral tablet (Prescribe): = 1 tab(s) ( 0.6 mg ), Oral, bid, x 7 day(s), # 14 tab(s), 0 Refill(s), Type: Maintenance, Pharmacy: [facility] #[id number], Patient will call., 1 tab(s) Oral bid,x7 day(s), 71.5, in, 11/4/2020 3:28 PM CST, Height Measured, 284, lb, 11/4/2020 3:28 PM CST, Weight Measured.     Orders   Charges (Evaluation and Management):  07178 office outpatient visit 15 minutes (Charge) (Order): Quantity: 1, Gout.

## 2022-02-16 NOTE — NURSING NOTE
Comprehensive Intake Entered On:  6/9/2020 1:40 PM CDT    Performed On:  6/9/2020 1:34 PM CDT by Jose A MEYERS Alexa               Summary   Chief Complaint :   Follow up labs. Verbal consent granted for telemedicine visit.   Menstrual Status :   N/A   Height Measured :   71.5 in(Converted to: 5 ft 11 in, 181.61 cm)    Alexa Naranjo CMA - 6/9/2020 1:34 PM CDT   Health Status   Allergies Verified? :   Yes   Medication History Verified? :   Yes   Immunizations Current :   Yes   Medical History Verified? :   Yes   Pre-Visit Planning Status :   Completed   Tobacco Use? :   Never smoker   Alexa Naranjo CMA - 6/9/2020 1:34 PM CDT   Consents   Consent for Immunization Exchange :   Consent Granted   Consent for Immunizations to Providers :   Consent Granted   Jose A Guzman MEYERShanie - 6/9/2020 1:34 PM CDT   Meds / Allergies   (As Of: 6/9/2020 1:40:52 PM CDT)   Allergies (Active)   Allegra-D 12 Hour  Estimated Onset Date:   Unspecified ; Created By:   Hanna Kothari; Reaction Status:   Active ; Category:   Drug ; Substance:   Allegra-D 12 Hour ; Type:   Allergy ; Updated By:   Hanna Kothari; Reviewed Date:   6/9/2020 1:40 PM CDT      penicillin V potassium  Estimated Onset Date:   Unspecified ; Reactions:   unknown ; Created By:   Meche Rodríguez; Reaction Status:   Active ; Category:   Drug ; Substance:   penicillin V potassium ; Type:   Allergy ; Updated By:   Meche Rodríguez; Reviewed Date:   6/9/2020 1:40 PM CDT        Medication List   (As Of: 6/9/2020 1:40:52 PM CDT)   Prescription/Discharge Order    allopurinol  :   allopurinol ; Status:   Prescribed ; Ordered As Mnemonic:   allopurinol 300 mg oral tablet ; Simple Display Line:   300 mg, 1 tab(s), Oral, daily, 90 tab(s), 3 Refill(s) ; Ordering Provider:   Zahra ENRIQUEZ [place]; Catalog Code:   allopurinol ; Order Dt/Tm:   6/4/2020 8:15:19 AM CDT          aspirin  :   aspirin ; Status:   Prescribed ; Ordered As Mnemonic:   aspirin 325 mg oral delayed release tablet ;  Simple Display Line:   325 mg, 1 tab(s), PO, Daily, 30 tab(s), 0 Refill(s) ; Ordering Provider:   Lui Sweeney MD; Catalog Code:   aspirin ; Order Dt/Tm:   3/31/2017 2:48:13 PM CDT          colchicine  :   colchicine ; Status:   Prescribed ; Ordered As Mnemonic:   Colcrys 0.6 mg oral tablet ; Simple Display Line:   0.6 mg, 1 tab(s), Oral, bid, for 7 day(s), 14 tab(s), 1 Refill(s) ; Ordering Provider:   Ede Rowland PA-C; Catalog Code:   colchicine ; Order Dt/Tm:   2/24/2020 3:05:15 PM CST          FLUoxetine  :   FLUoxetine ; Status:   Prescribed ; Ordered As Mnemonic:   FLUoxetine 20 mg oral capsule ; Simple Display Line:   1 cap(s), Oral, daily, 90 cap(s), 3 Refill(s) ; Ordering Provider:   Lui Sweeney MD; Catalog Code:   FLUoxetine ; Order Dt/Tm:   6/4/2020 8:15:21 AM CDT          lisinopril  :   lisinopril ; Status:   Prescribed ; Ordered As Mnemonic:   lisinopril 20 mg oral tablet ; Simple Display Line:   1 tab(s), Oral, daily, 90 tab(s), 3 Refill(s) ; Ordering Provider:   Lui Sweeney MD; Catalog Code:   lisinopril ; Order Dt/Tm:   6/4/2020 8:15:20 AM CDT          meloxicam  :   meloxicam ; Status:   Prescribed ; Ordered As Mnemonic:   meloxicam 15 mg oral tablet ; Simple Display Line:   15 mg, 1 tab(s), PO, Daily, 90 tab(s), 0 Refill(s) ; Ordering Provider:   Ede Rowland PA-C; Catalog Code:   meloxicam ; Order Dt/Tm:   12/12/2019 9:47:06 AM CST          Miscellaneous Rx Supply  :   Miscellaneous Rx Supply ; Status:   Prescribed ; Ordered As Mnemonic:   Knee high compression stockings ; Simple Display Line:   2 pair, top, daily, dispense 2 Pair, 2 EA, 2 Refill(s) ; Ordering Provider:   Lui Sweeney MD; Catalog Code:   Miscellaneous Rx Supply ; Order Dt/Tm:   4/3/2018 3:42:44 PM CDT            Home Meds    Miscellaneous Rx Supply  :   Miscellaneous Rx Supply ; Status:   Documented ; Ordered As Mnemonic:   C-Pap machine @ noc. ; Catalog Code:   Miscellaneous Rx Supply ;  Order Dt/Tm:   6/22/2012 11:09:38 AM CDT            ID Risk Screen   Recent Travel History :   No recent travel   Family Member Travel History :   No recent travel   Other Exposure to Infectious Disease :   Unknown   Alexa Naranjo CMA - 6/9/2020 1:34 PM CDT

## 2022-02-16 NOTE — TELEPHONE ENCOUNTER
Entered by Ede Rowland PA-C on June 08, 2021 7:56:57 AM CDT  ---------------------  From: Ede Rowland PA-C   To: [facility] #[id number]    Sent: 6/8/2021 7:56:57 AM CDT  Subject: Medication Management     ** Submitted: **  Order:FLUoxetine (FLUoxetine 20 mg oral capsule)  1 cap(s)  Oral  daily  Qty:  30 cap(s)        Refills:  0          Substitutions Allowed     Route To [facility] #[id number]    Signed by Ede Rowland PA-C  6/8/2021 12:56:00 PM [facility]    ** Submitted: **  Complete:FLUoxetine (FLUoxetine 20 mg oral capsule)   Signed by Ede Rowland PA-C  6/8/2021 12:56:00 PM [facility]    ** Not Approved:  **  FLUoxetine (FLUOXETINE 20MG CAPSULES)  TAKE 1 CAPSULE BY MOUTH DAILY  Qty:  90 cap(s)        Days Supply:  90        Refills:  0          Substitutions Allowed     Route To [facility] #[id number]   Signed by Ede Rowland PA-C            ------------------------------------------  From: [facility] #[id number]  To: Lui Sweeney MD  Sent: June 8, 2021 3:49:13 AM CDT  Subject: Medication Management  Due: June 4, 2021 8:26:15 PM CDT     ** On Hold Pending Signature **     Dispensed Drug: FLUoxetine (FLUoxetine 20 mg oral capsule), TAKE 1 CAPSULE BY MOUTH DAILY  Quantity: 90 cap(s)  Days Supply: 90  Refills: 0  Substitutions Allowed  Notes from Pharmacy:  ------------------------------------------

## 2022-03-30 ENCOUNTER — OFFICE VISIT (OUTPATIENT)
Dept: FAMILY MEDICINE | Facility: CLINIC | Age: 57
End: 2022-03-30
Payer: COMMERCIAL

## 2022-03-30 VITALS
DIASTOLIC BLOOD PRESSURE: 78 MMHG | HEART RATE: 80 BPM | WEIGHT: 298 LBS | SYSTOLIC BLOOD PRESSURE: 128 MMHG | BODY MASS INDEX: 40.36 KG/M2 | TEMPERATURE: 98 F | RESPIRATION RATE: 16 BRPM | HEIGHT: 72 IN

## 2022-03-30 DIAGNOSIS — F33.1 MODERATE RECURRENT MAJOR DEPRESSION (H): ICD-10-CM

## 2022-03-30 DIAGNOSIS — I10 PRIMARY HYPERTENSION: ICD-10-CM

## 2022-03-30 DIAGNOSIS — M79.89 MASS OF SOFT TISSUE OF NECK: Primary | ICD-10-CM

## 2022-03-30 PROBLEM — M10.9 GOUT: Status: ACTIVE | Noted: 2022-03-30

## 2022-03-30 PROBLEM — F32.1 MODERATE MAJOR DEPRESSION (H): Status: ACTIVE | Noted: 2022-03-30

## 2022-03-30 PROBLEM — G47.33 OBSTRUCTIVE SLEEP APNEA SYNDROME: Status: ACTIVE | Noted: 2022-03-30

## 2022-03-30 PROBLEM — Z86.718 HISTORY OF DEEP VENOUS THROMBOSIS: Status: ACTIVE | Noted: 2022-03-30

## 2022-03-30 PROBLEM — E66.9 OBESITY: Status: ACTIVE | Noted: 2022-03-30

## 2022-03-30 PROCEDURE — 99214 OFFICE O/P EST MOD 30 MIN: CPT | Performed by: PHYSICIAN ASSISTANT

## 2022-03-30 RX ORDER — ALLOPURINOL 300 MG/1
300 TABLET ORAL
COMMUNITY
Start: 2020-06-04 | End: 2023-05-30

## 2022-03-30 RX ORDER — LISINOPRIL 20 MG/1
TABLET ORAL
COMMUNITY
Start: 2021-04-26 | End: 2022-03-30

## 2022-03-30 RX ORDER — LORATADINE 10 MG/1
10 TABLET ORAL
COMMUNITY
Start: 2021-04-26 | End: 2023-06-06

## 2022-03-30 RX ORDER — DIPHENHYDRAMINE HCL 25 MG
25 CAPSULE ORAL EVERY 6 HOURS PRN
COMMUNITY
Start: 2021-04-26

## 2022-03-30 RX ORDER — LISINOPRIL 20 MG/1
20 TABLET ORAL DAILY
Qty: 90 TABLET | Refills: 3 | Status: SHIPPED | OUTPATIENT
Start: 2022-03-30 | End: 2023-03-15

## 2022-03-30 ASSESSMENT — ENCOUNTER SYMPTOMS: ROS SKIN COMMENTS: NEG EXCEPT HPI

## 2022-03-30 NOTE — PROGRESS NOTES
"  Assessment & Plan       Mass of soft tissue of neck  Enlarging, painful  - Adult General Surg Referral    Primary hypertension  Controlled  - lisinopril (ZESTRIL) 20 MG tablet  Dispense: 90 tablet; Refill: 3    Moderate recurrent major depression (H)  Stable, not suicidal  - FLUoxetine (PROZAC) 20 MG capsule  Dispense: 90 capsule; Refill: 3    - Adult General Surg Referral    Primary hypertension  Stable, controlled  - lisinopril (ZESTRIL) 20 MG tablet  Dispense: 90 tablet; Refill: 3    Moderate recurrent major depression (H)  Stable  - FLUoxetine (PROZAC) 20 MG capsule  Dispense: 90 capsule; Refill: 3               BMI:   Estimated body mass index is 40.98 kg/m  as calculated from the following:    Height as of this encounter: 1.816 m (5' 11.5\").    Weight as of this encounter: 135.2 kg (298 lb).           No follow-ups on file.    JIN Alfonso  [facility]   Christ is a 56 year old who presents for the following health issues  accompanied by his self.  Pt is c/o painful lump on upper left shoulder/neck area x month    Soft tissue swelling left posterior neck. Causing pain in neck and shoulder. Getting bigger over past few months. History of multiple lipomas.     History of Present Illness       Back Pain:  He presents for follow up of back pain. Patient's back pain is a new problem.    Original cause of back pain: other  First noticed back pain: more than 1 month ago  Patient feels back pain: comes and goesLocation of back pain:  Left side of neck and left shoulder  Description of back pain: burning  Back pain spreads: left side of neck    Since patient first noticed back pain, pain is: gradually worsening  Does back pain interfere with his job:  No  On a scale of 1-10 (10 being the worst), patient describes pain as:  5  What makes back pain worse: sitting  Acupuncture: not tried  Acetaminophen: not helpful  Activity or exercise: not tried  Chiropractor:  " "Helpful  Cold: not helpful  Heat: helpful  Massage: helpful  Muscle relaxants: not tried  NSAIDS: not tried  Opioids: not tried  Physical Therapy: not tried  Rest: not helpful  Steroid Injection: not tried  Stretching: helpful  Surgery: not tried  Other healthcare providers patient is seeing for back pain: Chiropractor    He eats 2-3 servings of fruits and vegetables daily.He consumes 0 sweetened beverage(s) daily.He exercises with enough effort to increase his heart rate 20 to 29 minutes per day.  He exercises with enough effort to increase his heart rate 3 or less days per week.   He is taking medications regularly.             Review of Systems   Skin:        Neg except HPI            Objective    /78 (BP Location: Right arm, Patient Position: Sitting, Cuff Size: Adult Large)   Pulse 80   Temp 98  F (36.7  C) (Tympanic)   Resp 16   Ht 1.816 m (5' 11.5\")   Wt 135.2 kg (298 lb)   BMI 40.98 kg/m    Body mass index is 40.98 kg/m .  Physical Exam  Skin:     Comments: 4 cm soft tissue mass, mobile left posterior neck. Likely lipoma or cyst                        "

## 2023-05-04 ENCOUNTER — MYC REFILL (OUTPATIENT)
Dept: FAMILY MEDICINE | Facility: CLINIC | Age: 58
End: 2023-05-04
Payer: COMMERCIAL

## 2023-05-04 DIAGNOSIS — F33.1 MODERATE RECURRENT MAJOR DEPRESSION (H): ICD-10-CM

## 2023-05-04 DIAGNOSIS — I10 PRIMARY HYPERTENSION: ICD-10-CM

## 2023-05-04 RX ORDER — LISINOPRIL 20 MG/1
20 TABLET ORAL DAILY
Qty: 90 TABLET | Refills: 0 | OUTPATIENT
Start: 2023-05-04

## 2023-05-20 ENCOUNTER — HEALTH MAINTENANCE LETTER (OUTPATIENT)
Age: 58
End: 2023-05-20

## 2023-05-30 ENCOUNTER — OFFICE VISIT (OUTPATIENT)
Dept: FAMILY MEDICINE | Facility: CLINIC | Age: 58
End: 2023-05-30
Payer: COMMERCIAL

## 2023-05-30 VITALS
WEIGHT: 302 LBS | HEIGHT: 72 IN | BODY MASS INDEX: 40.9 KG/M2 | RESPIRATION RATE: 18 BRPM | OXYGEN SATURATION: 96 % | SYSTOLIC BLOOD PRESSURE: 138 MMHG | TEMPERATURE: 98.4 F | HEART RATE: 70 BPM | DIASTOLIC BLOOD PRESSURE: 74 MMHG

## 2023-05-30 DIAGNOSIS — Z12.11 SCREEN FOR COLON CANCER: ICD-10-CM

## 2023-05-30 DIAGNOSIS — Z11.4 SCREENING FOR HIV (HUMAN IMMUNODEFICIENCY VIRUS): ICD-10-CM

## 2023-05-30 DIAGNOSIS — R53.83 OTHER FATIGUE: Primary | ICD-10-CM

## 2023-05-30 DIAGNOSIS — F33.1 MODERATE RECURRENT MAJOR DEPRESSION (H): ICD-10-CM

## 2023-05-30 DIAGNOSIS — I10 PRIMARY HYPERTENSION: ICD-10-CM

## 2023-05-30 DIAGNOSIS — Z11.59 NEED FOR HEPATITIS C SCREENING TEST: ICD-10-CM

## 2023-05-30 LAB
ANION GAP SERPL CALCULATED.3IONS-SCNC: 12 MMOL/L (ref 7–15)
BASOPHILS # BLD AUTO: 0.1 10E3/UL (ref 0–0.2)
BASOPHILS NFR BLD AUTO: 1 %
BUN SERPL-MCNC: 16.1 MG/DL (ref 6–20)
CALCIUM SERPL-MCNC: 9.2 MG/DL (ref 8.6–10)
CHLORIDE SERPL-SCNC: 110 MMOL/L (ref 98–107)
CHOLEST SERPL-MCNC: 184 MG/DL
CREAT SERPL-MCNC: 1.33 MG/DL (ref 0.67–1.17)
DEPRECATED HCO3 PLAS-SCNC: 22 MMOL/L (ref 22–29)
EOSINOPHIL # BLD AUTO: 0.2 10E3/UL (ref 0–0.7)
EOSINOPHIL NFR BLD AUTO: 3 %
ERYTHROCYTE [DISTWIDTH] IN BLOOD BY AUTOMATED COUNT: 13.5 % (ref 10–15)
GFR SERPL CREATININE-BSD FRML MDRD: 62 ML/MIN/1.73M2
GLUCOSE SERPL-MCNC: 111 MG/DL (ref 70–99)
HCT VFR BLD AUTO: 34.8 % (ref 40–53)
HDLC SERPL-MCNC: 29 MG/DL
HGB BLD-MCNC: 11.7 G/DL (ref 13.3–17.7)
IMM GRANULOCYTES # BLD: 0 10E3/UL
IMM GRANULOCYTES NFR BLD: 0 %
LDLC SERPL CALC-MCNC: 100 MG/DL
LYMPHOCYTES # BLD AUTO: 1.3 10E3/UL (ref 0.8–5.3)
LYMPHOCYTES NFR BLD AUTO: 21 %
MCH RBC QN AUTO: 29.9 PG (ref 26.5–33)
MCHC RBC AUTO-ENTMCNC: 33.6 G/DL (ref 31.5–36.5)
MCV RBC AUTO: 89 FL (ref 78–100)
MONOCYTES # BLD AUTO: 0.5 10E3/UL (ref 0–1.3)
MONOCYTES NFR BLD AUTO: 9 %
NEUTROPHILS # BLD AUTO: 4 10E3/UL (ref 1.6–8.3)
NEUTROPHILS NFR BLD AUTO: 66 %
NONHDLC SERPL-MCNC: 155 MG/DL
PLATELET # BLD AUTO: 206 10E3/UL (ref 150–450)
POTASSIUM SERPL-SCNC: 4 MMOL/L (ref 3.4–5.3)
RBC # BLD AUTO: 3.91 10E6/UL (ref 4.4–5.9)
SODIUM SERPL-SCNC: 144 MMOL/L (ref 136–145)
TRIGL SERPL-MCNC: 273 MG/DL
TSH SERPL DL<=0.005 MIU/L-ACNC: 1.3 UIU/ML (ref 0.3–4.2)
WBC # BLD AUTO: 6 10E3/UL (ref 4–11)

## 2023-05-30 PROCEDURE — 86803 HEPATITIS C AB TEST: CPT | Performed by: PHYSICIAN ASSISTANT

## 2023-05-30 PROCEDURE — 84443 ASSAY THYROID STIM HORMONE: CPT | Performed by: PHYSICIAN ASSISTANT

## 2023-05-30 PROCEDURE — 99214 OFFICE O/P EST MOD 30 MIN: CPT | Performed by: PHYSICIAN ASSISTANT

## 2023-05-30 PROCEDURE — 83550 IRON BINDING TEST: CPT | Performed by: PHYSICIAN ASSISTANT

## 2023-05-30 PROCEDURE — 36415 COLL VENOUS BLD VENIPUNCTURE: CPT | Performed by: PHYSICIAN ASSISTANT

## 2023-05-30 PROCEDURE — 80048 BASIC METABOLIC PNL TOTAL CA: CPT | Performed by: PHYSICIAN ASSISTANT

## 2023-05-30 PROCEDURE — 82728 ASSAY OF FERRITIN: CPT | Performed by: PHYSICIAN ASSISTANT

## 2023-05-30 PROCEDURE — 87389 HIV-1 AG W/HIV-1&-2 AB AG IA: CPT | Performed by: PHYSICIAN ASSISTANT

## 2023-05-30 PROCEDURE — 80061 LIPID PANEL: CPT | Performed by: PHYSICIAN ASSISTANT

## 2023-05-30 PROCEDURE — 85025 COMPLETE CBC W/AUTO DIFF WBC: CPT | Mod: QW | Performed by: PHYSICIAN ASSISTANT

## 2023-05-30 PROCEDURE — 83540 ASSAY OF IRON: CPT | Performed by: PHYSICIAN ASSISTANT

## 2023-05-30 RX ORDER — LISINOPRIL 20 MG/1
20 TABLET ORAL DAILY
Qty: 90 TABLET | Refills: 3 | Status: SHIPPED | OUTPATIENT
Start: 2023-05-30 | End: 2023-06-15

## 2023-05-30 ASSESSMENT — ENCOUNTER SYMPTOMS
APPETITE CHANGE: 1
FATIGUE: 1
ACTIVITY CHANGE: 1
GASTROINTESTINAL NEGATIVE: 1
CARDIOVASCULAR NEGATIVE: 1
ARTHRALGIAS: 1
RESPIRATORY NEGATIVE: 1

## 2023-05-30 ASSESSMENT — PATIENT HEALTH QUESTIONNAIRE - PHQ9
SUM OF ALL RESPONSES TO PHQ QUESTIONS 1-9: 3
10. IF YOU CHECKED OFF ANY PROBLEMS, HOW DIFFICULT HAVE THESE PROBLEMS MADE IT FOR YOU TO DO YOUR WORK, TAKE CARE OF THINGS AT HOME, OR GET ALONG WITH OTHER PEOPLE: NOT DIFFICULT AT ALL
SUM OF ALL RESPONSES TO PHQ QUESTIONS 1-9: 3

## 2023-05-30 NOTE — PROGRESS NOTES
"  Assessment & Plan     Moderate recurrent major depression (H)  Stable on current medication  - FLUoxetine (PROZAC) 20 MG capsule  Dispense: 90 capsule; Refill: 3  - TSH with free T4 reflex  - TSH with free T4 reflex    Primary hypertension  Ideally controlled  - lisinopril (ZESTRIL) 20 MG tablet  Dispense: 90 tablet; Refill: 3  - Lipid panel reflex to direct LDL Non-fasting  - Basic metabolic panel  - CBC with Platelets & Differential  - TSH with free T4 reflex  - Lipid panel reflex to direct LDL Non-fasting  - Basic metabolic panel  - CBC with Platelets & Differential  - TSH with free T4 reflex    Screen for colon cancer  He has the kit at home will send in    Screening for HIV (human immunodeficiency virus)  Consents to screening  - HIV Antigen Antibody Combo  - TSH with free T4 reflex  - HIV Antigen Antibody Combo  - TSH with free T4 reflex    Need for hepatitis C screening test  Consents to screening  - Hepatitis C Screen Reflex to HCV RNA Quant and Genotype  - CBC with Platelets & Differential  - TSH with free T4 reflex  - Hepatitis C Screen Reflex to HCV RNA Quant and Genotype  - CBC with Platelets & Differential  - TSH with free T4 reflex             BMI:   Estimated body mass index is 41.53 kg/m  as calculated from the following:    Height as of this encounter: 1.816 m (5' 11.5\").    Weight as of this encounter: 137 kg (302 lb).           [facility]  [facility] 57 [facility], presenting for the following health issues:  Depression and Hypertension      57-year-old presents to the clinic to refill his medications #1 his fluoxetine this is working very well for him he is tolerating it without side effects #2 his lisinopril is high blood pressure has been under great control  Concern he has today is his weight appetite which is excessive and fatigue he wonders about starting some medication to help him lose weight I told him I would check " "screening lab work and if no concerns we could consider starting him on on Wegovy we did discuss the medication with him    History of Present Illness       Reason for visit:  Med check    He eats 2-3 servings of fruits and vegetables daily.He consumes 0 sweetened beverage(s) daily.He exercises with enough effort to increase his heart rate 9 or less minutes per day.  He exercises with enough effort to increase his heart rate 3 or less days per week.   He is taking medications regularly.    Today's PHQ-9         PHQ-9 Total Score: 3    PHQ-9 Q9 Thoughts of better off dead/self-harm past 2 weeks :   Not at all    How difficult have these problems made it for you to do your work, take care of things at home, or get along with other people: Not difficult at all                 Review of Systems   Constitutional: Positive for activity change, appetite change and fatigue.   HENT: Negative.    Respiratory: Negative.    Cardiovascular: Negative.    Gastrointestinal: Negative.    Musculoskeletal: Positive for arthralgias.            Objective    /74   Pulse 70   Temp 98.4  F (36.9  C)   Resp 18   Ht 1.816 m (5' 11.5\")   Wt 137 kg (302 lb)   SpO2 96%   BMI 41.53 kg/m    Body mass index is 41.53 kg/m .  Physical Exam  Vitals and nursing note reviewed.   Constitutional:       Appearance: Normal appearance.   HENT:      Head: Normocephalic and atraumatic.      Right Ear: Tympanic membrane normal.      Left Ear: Tympanic membrane normal.      Nose: Nose normal. No congestion or rhinorrhea.      Mouth/Throat:      Mouth: Mucous membranes are moist.   Eyes:      Conjunctiva/sclera: Conjunctivae normal.   Cardiovascular:      Rate and Rhythm: Normal rate and regular rhythm.      Heart sounds: Normal heart sounds.   Pulmonary:      Effort: Pulmonary effort is normal.      Breath sounds: Normal breath sounds.   Abdominal:      General: Abdomen is flat. Bowel sounds are normal.      Palpations: There is no mass.      " Tenderness: There is no abdominal tenderness.      Comments: Rotund   Musculoskeletal:         General: Normal range of motion.      Cervical back: Normal range of motion and neck supple.      Right lower leg: No edema.      Left lower leg: No edema.   Lymphadenopathy:      Cervical: No cervical adenopathy.   Skin:     General: Skin is warm and dry.   Neurological:      General: No focal deficit present.   Psychiatric:         Mood and Affect: Mood normal.         Behavior: Behavior normal.         Thought Content: Thought content normal.         Judgment: Judgment normal.

## 2023-05-31 DIAGNOSIS — Z12.11 SCREEN FOR COLON CANCER: Primary | ICD-10-CM

## 2023-05-31 DIAGNOSIS — E66.01 MORBID OBESITY (H): ICD-10-CM

## 2023-05-31 DIAGNOSIS — R73.02 IGT (IMPAIRED GLUCOSE TOLERANCE): ICD-10-CM

## 2023-05-31 LAB
FERRITIN SERPL-MCNC: 189 NG/ML (ref 31–409)
HCV AB SERPL QL IA: NONREACTIVE
HIV 1+2 AB+HIV1 P24 AG SERPL QL IA: NONREACTIVE
IRON BINDING CAPACITY (ROCHE): 257 UG/DL (ref 240–430)
IRON SATN MFR SERPL: 22 % (ref 15–46)
IRON SERPL-MCNC: 56 UG/DL (ref 61–157)

## 2023-06-01 ENCOUNTER — ANCILLARY PROCEDURE (OUTPATIENT)
Dept: GENERAL RADIOLOGY | Facility: CLINIC | Age: 58
End: 2023-06-01
Attending: FAMILY MEDICINE
Payer: COMMERCIAL

## 2023-06-01 ENCOUNTER — OFFICE VISIT (OUTPATIENT)
Dept: FAMILY MEDICINE | Facility: CLINIC | Age: 58
End: 2023-06-01
Payer: COMMERCIAL

## 2023-06-01 VITALS
TEMPERATURE: 100.1 F | OXYGEN SATURATION: 93 % | BODY MASS INDEX: 41.04 KG/M2 | RESPIRATION RATE: 20 BRPM | DIASTOLIC BLOOD PRESSURE: 84 MMHG | SYSTOLIC BLOOD PRESSURE: 150 MMHG | WEIGHT: 303 LBS | HEIGHT: 72 IN | HEART RATE: 82 BPM

## 2023-06-01 DIAGNOSIS — R50.9 FEVER, UNSPECIFIED FEVER CAUSE: ICD-10-CM

## 2023-06-01 DIAGNOSIS — I50.21 ACUTE SYSTOLIC CONGESTIVE HEART FAILURE (H): ICD-10-CM

## 2023-06-01 DIAGNOSIS — R06.02 SOB (SHORTNESS OF BREATH): ICD-10-CM

## 2023-06-01 DIAGNOSIS — R06.02 SOB (SHORTNESS OF BREATH): Primary | ICD-10-CM

## 2023-06-01 LAB
NT-PROBNP SERPL-MCNC: 1230 PG/ML (ref 0–900)
TROPONIN T SERPL HS-MCNC: 16 NG/L

## 2023-06-01 PROCEDURE — 83880 ASSAY OF NATRIURETIC PEPTIDE: CPT | Performed by: FAMILY MEDICINE

## 2023-06-01 PROCEDURE — 99214 OFFICE O/P EST MOD 30 MIN: CPT | Performed by: FAMILY MEDICINE

## 2023-06-01 PROCEDURE — 87635 SARS-COV-2 COVID-19 AMP PRB: CPT | Performed by: FAMILY MEDICINE

## 2023-06-01 PROCEDURE — 36415 COLL VENOUS BLD VENIPUNCTURE: CPT | Performed by: FAMILY MEDICINE

## 2023-06-01 PROCEDURE — 71046 X-RAY EXAM CHEST 2 VIEWS: CPT | Mod: TC | Performed by: RADIOLOGY

## 2023-06-01 PROCEDURE — 84484 ASSAY OF TROPONIN QUANT: CPT | Performed by: FAMILY MEDICINE

## 2023-06-01 RX ORDER — FUROSEMIDE 20 MG
20 TABLET ORAL DAILY
Qty: 30 TABLET | Refills: 0 | Status: SHIPPED | OUTPATIENT
Start: 2023-06-01 | End: 2023-07-11

## 2023-06-01 ASSESSMENT — ENCOUNTER SYMPTOMS
FATIGUE: 1
CHILLS: 1
WHEEZING: 1
FEVER: 1
HEADACHES: 1
SHORTNESS OF BREATH: 1

## 2023-06-01 NOTE — PROGRESS NOTES
"  Assessment & Plan     SOB (shortness of breath)  - XR Chest 2 Views; Future    Acute systolic congestive heart failure (H)  Seen on CXR  - BNP-N terminal pro; Future will draw troponin to see if something happened to cause CHF  Lasix    Fever  Will get Covid test           BMI:   Estimated body mass index is 41.67 kg/m  as calculated from the following:    Height as of this encounter: 1.816 m (5' 11.5\").    Weight as of this encounter: 137.4 kg (303 lb).   Weight management plan: Discussed healthy diet and exercise guidelines    F/U Tuesday    Lui Sweeney MD  [facility]    Javier Polo is a 57 year old, presenting for the following health issues:  Fatigue (SOB, headache and sweating)        6/1/2023    10:40 AM   Additional Questions   Roomed by SRud   Accompanied by n/a     Fatigue  This is a recurrent problem. The current episode started more than 1 month ago. The problem occurs daily. The problem has been rapidly worsening. Associated symptoms include chills, fatigue, a fever and headaches. The symptoms are aggravated by exertion and walking. He has tried relaxation, rest and sleep for the symptoms. The treatment provided mild relief.        Concern - SOB  Onset: 1 week  Description: worse with activity  Intensity: moderate  Progression of Symptoms:  worsening  Accompanying Signs & Symptoms: swelling in legs  Previous history of similar problem: no            Review of Systems   Constitutional: Positive for chills, fatigue and fever.   Respiratory: Positive for shortness of breath and wheezing.    Neurological: Positive for headaches.            Objective    BP (!) 150/84 (BP Location: Right arm, Patient Position: Sitting)   Pulse 82   Temp 100.1  F (37.8  C) (Tympanic)   Resp 20   Ht 1.816 m (5' 11.5\")   Wt 137.4 kg (303 lb)   SpO2 93%   BMI 41.67 kg/m    Body mass index is 41.67 kg/m .  Physical Exam   GENERAL: healthy, alert and no distress  NECK: no " adenopathy, no asymmetry, masses, or scars and thyroid normal to palpation  RESP: lungs clear to auscultation - no rales, rhonchi or wheezes  CV: regular rates and rhythm, normal S1 S2, no S3 or S4, no murmur, click or rub, peripheral pulses strong and 2+ bilateral lower extremity pitting edema to mid shin    ABDOMEN: soft, nontender, no hepatosplenomegaly, no masses and bowel sounds normal  MS: no gross musculoskeletal defects noted, no edema    Xray - Reviewed and interpreted by me.  CXR IMPRESSION: Pulmonary vascular congestion and curly B lines consistent with CHF. Heart size upper normal.

## 2023-06-02 LAB — SARS-COV-2 RNA RESP QL NAA+PROBE: NEGATIVE

## 2023-06-04 ENCOUNTER — MYC MEDICAL ADVICE (OUTPATIENT)
Dept: FAMILY MEDICINE | Facility: CLINIC | Age: 58
End: 2023-06-04
Payer: COMMERCIAL

## 2023-06-06 ENCOUNTER — OFFICE VISIT (OUTPATIENT)
Dept: FAMILY MEDICINE | Facility: CLINIC | Age: 58
End: 2023-06-06
Payer: COMMERCIAL

## 2023-06-06 VITALS
SYSTOLIC BLOOD PRESSURE: 148 MMHG | HEIGHT: 72 IN | HEART RATE: 66 BPM | WEIGHT: 292 LBS | DIASTOLIC BLOOD PRESSURE: 82 MMHG | BODY MASS INDEX: 39.55 KG/M2 | OXYGEN SATURATION: 98 % | RESPIRATION RATE: 20 BRPM | TEMPERATURE: 97.3 F

## 2023-06-06 DIAGNOSIS — M10.9 ACUTE GOUT OF RIGHT FOOT, UNSPECIFIED CAUSE: ICD-10-CM

## 2023-06-06 DIAGNOSIS — E66.01 MORBID OBESITY (H): ICD-10-CM

## 2023-06-06 DIAGNOSIS — Z12.11 SCREEN FOR COLON CANCER: Primary | ICD-10-CM

## 2023-06-06 DIAGNOSIS — I50.21 ACUTE SYSTOLIC CONGESTIVE HEART FAILURE (H): ICD-10-CM

## 2023-06-06 LAB
ALT SERPL W P-5'-P-CCNC: 59 U/L (ref 10–50)
ANION GAP SERPL CALCULATED.3IONS-SCNC: 13 MMOL/L (ref 7–15)
BUN SERPL-MCNC: 19.6 MG/DL (ref 6–20)
CALCIUM SERPL-MCNC: 9.7 MG/DL (ref 8.6–10)
CHLORIDE SERPL-SCNC: 103 MMOL/L (ref 98–107)
CREAT SERPL-MCNC: 1.18 MG/DL (ref 0.67–1.17)
DEPRECATED HCO3 PLAS-SCNC: 26 MMOL/L (ref 22–29)
GFR SERPL CREATININE-BSD FRML MDRD: 72 ML/MIN/1.73M2
GLUCOSE SERPL-MCNC: 108 MG/DL (ref 70–99)
POTASSIUM SERPL-SCNC: 4.3 MMOL/L (ref 3.4–5.3)
SODIUM SERPL-SCNC: 142 MMOL/L (ref 136–145)
URATE SERPL-MCNC: 8.3 MG/DL (ref 3.4–7)

## 2023-06-06 PROCEDURE — 93000 ELECTROCARDIOGRAM COMPLETE: CPT | Performed by: FAMILY MEDICINE

## 2023-06-06 PROCEDURE — 80048 BASIC METABOLIC PNL TOTAL CA: CPT | Performed by: FAMILY MEDICINE

## 2023-06-06 PROCEDURE — 84460 ALANINE AMINO (ALT) (SGPT): CPT | Performed by: FAMILY MEDICINE

## 2023-06-06 PROCEDURE — 36415 COLL VENOUS BLD VENIPUNCTURE: CPT | Performed by: FAMILY MEDICINE

## 2023-06-06 PROCEDURE — 84550 ASSAY OF BLOOD/URIC ACID: CPT | Performed by: FAMILY MEDICINE

## 2023-06-06 PROCEDURE — 99214 OFFICE O/P EST MOD 30 MIN: CPT | Performed by: FAMILY MEDICINE

## 2023-06-06 RX ORDER — PREDNISONE 10 MG/1
TABLET ORAL
Qty: 42 TABLET | Refills: 0 | Status: SHIPPED | OUTPATIENT
Start: 2023-06-06 | End: 2023-06-26

## 2023-06-06 RX ORDER — PREDNISONE 20 MG/1
2 TABLET ORAL DAILY
COMMUNITY
Start: 2023-06-04 | End: 2023-06-08

## 2023-06-06 RX ORDER — CARVEDILOL 6.25 MG/1
6.25 TABLET ORAL 2 TIMES DAILY WITH MEALS
Qty: 60 TABLET | Refills: 4 | Status: SHIPPED | OUTPATIENT
Start: 2023-06-06 | End: 2024-03-04

## 2023-06-06 ASSESSMENT — PAIN SCALES - GENERAL: PAINLEVEL: MODERATE PAIN (4)

## 2023-06-06 NOTE — PROGRESS NOTES
"  Assessment & Plan     Screen for colon cancer  - Colonoscopy Screening  Referral; Future    Acute systolic congestive heart failure (H)  F/U Cardiology later this month  - ALT; Future  - EKG 12-lead complete w/read - Clinics  - carvedilol (COREG) 6.25 MG tablet; Take 1 tablet (6.25 mg) by mouth 2 times daily (with meals)  - Basic metabolic panel  (Ca, Cl, CO2, Creat, Gluc, K, Na, BUN); Future    Acute gout of right foot, unspecified cause  exacerbation  - Uric acid; Future  - predniSONE (DELTASONE) 10 MG tablet; Take 4 tablets (40 mg) by mouth daily for 4 days, THEN 3 tablets (30 mg) daily for 4 days, THEN 2 tablets (20 mg) daily for 4 days, THEN 1 tablet (10 mg) daily for 4 days, THEN 0.5 tablets (5 mg) daily for 4 days.      30 minutes spent by me on the date of the encounter doing chart review, history and exam, documentation and further activities per the note     MED REC REQUIRED  Post Medication Reconciliation Status:     F/U End of month    Lui Sweeney MD  [facility]    Javier Polo is a 57 year old, presenting for the following health issues:  ER F/U        6/6/2023    10:44 AM   Additional Questions   Roomed by SRud   Accompanied by n/a     HPI     Plan of care communicated with patient                   Review of Systems   Constitutional, HEENT, cardiovascular, pulmonary, gi and gu systems are negative, except as otherwise noted.      Objective    BP (!) 152/84 (BP Location: Right arm, Patient Position: Sitting)   Pulse 66   Temp 97.3  F (36.3  C) (Tympanic)   Resp 20   Ht 1.816 m (5' 11.5\")   Wt 132.5 kg (292 lb)   SpO2 98%   BMI 40.16 kg/m    Body mass index is 40.16 kg/m .  Physical Exam   GENERAL: healthy, alert and no distress  NECK: no adenopathy, no asymmetry, masses, or scars and thyroid normal to palpation  RESP: lungs clear to auscultation - no rales, rhonchi or wheezes  CV: regular rate and rhythm, normal S1 S2, no S3 or S4, no " murmur, click or rub, no peripheral edema and peripheral pulses strong  ABDOMEN: soft, nontender, no hepatosplenomegaly, no masses and bowel sounds normal  MS: no gross musculoskeletal defects noted, no edema    EKG Regular Rate, no P waves, NS T wave changes

## 2023-06-06 NOTE — LETTER
June 6, 2023      Christ Bennett  [address] [place] [address]        To Whom It May Concern:    Christ MCKAY Yamilanupam was seen in our clinic. He may not return to work until he is seen again after June 23rd.      Sincerely,        Lui Sweeney MD

## 2023-06-15 ENCOUNTER — OFFICE VISIT (OUTPATIENT)
Dept: CARDIOLOGY | Facility: CLINIC | Age: 58
End: 2023-06-15
Attending: FAMILY MEDICINE
Payer: COMMERCIAL

## 2023-06-15 VITALS
HEART RATE: 88 BPM | OXYGEN SATURATION: 97 % | BODY MASS INDEX: 36.77 KG/M2 | WEIGHT: 271.5 LBS | SYSTOLIC BLOOD PRESSURE: 136 MMHG | HEIGHT: 72 IN | RESPIRATION RATE: 16 BRPM | DIASTOLIC BLOOD PRESSURE: 85 MMHG

## 2023-06-15 DIAGNOSIS — R06.02 SOB (SHORTNESS OF BREATH): ICD-10-CM

## 2023-06-15 DIAGNOSIS — Z87.74 S/P VSD REPAIR: Primary | ICD-10-CM

## 2023-06-15 DIAGNOSIS — I50.21 ACUTE SYSTOLIC CONGESTIVE HEART FAILURE (H): ICD-10-CM

## 2023-06-15 PROCEDURE — 99204 OFFICE O/P NEW MOD 45 MIN: CPT | Performed by: INTERNAL MEDICINE

## 2023-06-15 RX ORDER — LISINOPRIL 5 MG/1
5 TABLET ORAL DAILY
Qty: 90 TABLET | Refills: 3 | Status: SHIPPED | OUTPATIENT
Start: 2023-06-15 | End: 2023-07-24

## 2023-06-15 NOTE — PROGRESS NOTES
"  HEART CARE ENCOUNTER CONSULTATON NOTE      [facility]  [phone]      Assessment/Recommendations   Assessment:  1. New onset heart failure unknown ejection fraction: appears better compensated on lasix.  Will start ACEI and BB.  Awaiting echocardiogram.  2. History of heart surgery possibly VSD repair  3. LISBETH on CPAP    Plan:  1. Echocardiogram with bubble study  2. Continue lasix 20 mg daily  3. Add lisinopril 5 mg daily and carvedilol 6.25 mg twice daily  4. Follow up in heart failure clinic in 2 weeks and with myself in 1-2 months.  Further ischemic workup pending results of echocardiogram       History of Present Illness/Subjective    HPI: Christ Bennett is a 57 year old male with history of LISBETH on CPAP, cardiac surgery (possibly VSD repair) at 6/8 years old, obesity who I am seeing for an initial consultation due to worsening shortness of breath and new diagnosis of heart failure.  He started noticing increased edema and fatigue about a year ago.  More recently over about the past 6 weeks he started noting worsening shortness of breath with exertion and weight gain.  He gained about 15 lbs.  No complaints of chest pain. He was started on lasix 6/1 with 30 lb weight loss and significant improvement in symptoms.N pro BNP 1230, troponin negative.     ECG 6/6/23 personally reviewed demonstrates normal sinus rhythm at 66 bpm with STT abnormalities anterseptal leads    Recent Echocardiogram Results:  pending         Physical Examination  Review of Systems   Vitals: /85 (BP Location: Left arm, Patient Position: Sitting, Cuff Size: Adult Large)   Pulse 88   Resp 16   Ht 1.816 m (5' 11.5\")   Wt 123.2 kg (271 lb 8 oz)   SpO2 97%   BMI 37.34 kg/m    BMI= Body mass index is 37.34 kg/m .  Wt Readings from Last 3 Encounters:   06/15/23 123.2 kg (271 lb 8 oz)   06/06/23 132.5 kg (292 lb)   06/01/23 137.4 kg (303 lb)       General Appearance:   no distress, normal body habitus "   ENT/Mouth: membranes moist, no oral lesions or bleeding gums.      EYES:  no scleral icterus, normal conjunctivae   Neck: no carotid bruits or thyromegaly   Chest/Lungs:   lungs are clear to auscultation   Cardiovascular:   Regular. Normal first and second heart sounds with no murmur  + edema bilaterally    Abdomen:  no organomegaly, masses, bruits, or tenderness; bowel sounds are present   Extremities: no cyanosis or clubbing   Skin: no xanthelasma, warm.    Neurologic: normal  bilateral, no tremors     Psychiatric: alert and oriented x3, calm     Cardiac ROS  Eyes: Negative  Ears/Nose/Throat: Negative  Respiratory: Negative  Gastrointestinal: Negative  Genitourinary: Negative  Musculoskeletal: Negative  Neurologic: Negative  Psychiatric: Negative  Hematologic/Lymphatic/Immunologic: Negative  Endocrine: Negative  Please refer above for cardiac ROS details.        Medical History  Surgical History Family History Social History   Depression  Congestive heart failure No past surgical history on file.    Mother - CVA Social History     Socioeconomic History     Marital status:      Spouse name: Not on file     Number of children: Not on file     Years of education: Not on file     Highest education level: Not on file   Occupational History     Not on file   Tobacco Use     Smoking status: Never     Smokeless tobacco: Never   Vaping Use     Vaping status: Never Used   Substance and Sexual Activity     Alcohol use: Not on file     Drug use: Not on file     Sexual activity: Yes     Partners: Female   Other Topics Concern     Not on file   Social History Narrative     Not on file     Social Determinants of Health     Financial Resource Strain: Not on file   Food Insecurity: Not on file   Transportation Needs: Not on file   Physical Activity: Not on file   Stress: Not on file   Social Connections: Not on file   Intimate Partner Violence: Not on file   Housing Stability: Not on file           Medications   Allergies   Current Outpatient Medications   Medication Sig Dispense Refill     carvedilol (COREG) 6.25 MG tablet Take 1 tablet (6.25 mg) by mouth 2 times daily (with meals) 60 tablet 4     diphenhydrAMINE (BENADRYL ALLERGY) 25 MG capsule Take 25 mg by mouth every 6 hours as needed       FLUoxetine (PROZAC) 20 MG capsule Take 1 capsule (20 mg) by mouth daily 90 capsule 3     furosemide (LASIX) 20 MG tablet Take 1 tablet (20 mg) by mouth daily 30 tablet 0     lisinopril (ZESTRIL) 5 MG tablet Take 1 tablet (5 mg) by mouth daily 90 tablet 3     predniSONE (DELTASONE) 10 MG tablet Take 4 tablets (40 mg) by mouth daily for 4 days, THEN 3 tablets (30 mg) daily for 4 days, THEN 2 tablets (20 mg) daily for 4 days, THEN 1 tablet (10 mg) daily for 4 days, THEN 0.5 tablets (5 mg) daily for 4 days. 42 tablet 0       Allergies   Allergen Reactions     Fexofenadine-Pseudoephed Er      Other reaction(s): Chest Pain  High BP       Penicillins      Other reaction(s): *Unknown - Childhood Rxn, unknown          Lab Results    Chemistry/lipid CBC Cardiac Enzymes/BNP/TSH/INR   Recent Labs   Lab Test 05/30/23  1440   CHOL 184   HDL 29*      TRIG 273*     Recent Labs   Lab Test 05/30/23  1440 08/04/15  1622    121     Recent Labs   Lab Test 06/06/23  1143      POTASSIUM 4.3   CHLORIDE 103   CO2 26   *   BUN 19.6   CR 1.18*   GFRESTIMATED 72   TJ 9.7     Recent Labs   Lab Test 06/06/23  1143 05/30/23  1440 04/26/21  1134   CR 1.18* 1.33* 1.13     No results for input(s): A1C in the last 84138 hours.       Recent Labs   Lab Test 05/30/23  1440   WBC 6.0   HGB 11.7*   HCT 34.8*   MCV 89        Recent Labs   Lab Test 05/30/23  1440 04/26/21  1134 06/02/20  1511   HGB 11.7* 13.2 12.5*    No results for input(s): TROPONINI in the last 81461 hours.  Recent Labs   Lab Test 06/01/23  1217   NTBNP 1,230*     Recent Labs   Lab Test 05/30/23  1440   TSH 1.30     No results for input(s): INR in the last 08066 hours.      Sakshi Hernandez MD

## 2023-06-15 NOTE — LETTER
"6/15/2023    Lui Sweeney MD  [address] [facility] [address]    RE: Christ Bennett       Dear Colleague,     I had the pleasure of seeing Christ Bennett in the [facility].    HEART CARE ENCOUNTER CONSULTATON NOTE      [facility]  [phone]      Assessment/Recommendations   Assessment:  1. New onset heart failure unknown ejection fraction: appears better compensated on lasix.  Will start ACEI and BB.  Awaiting echocardiogram.  2. History of heart surgery possibly VSD repair  3. LISBETH on CPAP    Plan:  1. Echocardiogram with bubble study  2. Continue lasix 20 mg daily  3. Add lisinopril 5 mg daily and carvedilol 6.25 mg twice daily  4. Follow up in heart failure clinic in 2 weeks and with myself in 1-2 months.  Further ischemic workup pending results of echocardiogram       History of Present Illness/Subjective    HPI: Christ Bennett is a 57 year old male with history of LISBETH on CPAP, cardiac surgery (possibly VSD repair) at 6/8 years old, obesity who I am seeing for an initial consultation due to worsening shortness of breath and new diagnosis of heart failure.  He started noticing increased edema and fatigue about a year ago.  More recently over about the past 6 weeks he started noting worsening shortness of breath with exertion and weight gain.  He gained about 15 lbs.  No complaints of chest pain. He was started on lasix 6/1 with 30 lb weight loss and significant improvement in symptoms.N pro BNP 1230, troponin negative.     ECG 6/6/23 personally reviewed demonstrates normal sinus rhythm at 66 bpm with STT abnormalities anterseptal leads    Recent Echocardiogram Results:  pending         Physical Examination  Review of Systems   Vitals: /85 (BP Location: Left arm, Patient Position: Sitting, Cuff Size: Adult Large)   Pulse 88   Resp 16   Ht 1.816 m (5' 11.5\")   Wt 123.2 kg (271 lb 8 oz)   SpO2 97%   BMI 37.34 kg/m    BMI= Body mass index is " 37.34 kg/m .  Wt Readings from Last 3 Encounters:   06/15/23 123.2 kg (271 lb 8 oz)   06/06/23 132.5 kg (292 lb)   06/01/23 137.4 kg (303 lb)       General Appearance:   no distress, normal body habitus   ENT/Mouth: membranes moist, no oral lesions or bleeding gums.      EYES:  no scleral icterus, normal conjunctivae   Neck: no carotid bruits or thyromegaly   Chest/Lungs:   lungs are clear to auscultation   Cardiovascular:   Regular. Normal first and second heart sounds with no murmur  + edema bilaterally    Abdomen:  no organomegaly, masses, bruits, or tenderness; bowel sounds are present   Extremities: no cyanosis or clubbing   Skin: no xanthelasma, warm.    Neurologic: normal  bilateral, no tremors     Psychiatric: alert and oriented x3, calm     Cardiac ROS  Eyes: Negative  Ears/Nose/Throat: Negative  Respiratory: Negative  Gastrointestinal: Negative  Genitourinary: Negative  Musculoskeletal: Negative  Neurologic: Negative  Psychiatric: Negative  Hematologic/Lymphatic/Immunologic: Negative  Endocrine: Negative  Please refer above for cardiac ROS details.        Medical History  Surgical History Family History Social History   Depression  Congestive heart failure No past surgical history on file.    Mother - CVA Social History     Socioeconomic History    Marital status:      Spouse name: Not on file    Number of children: Not on file    Years of education: Not on file    Highest education level: Not on file   Occupational History    Not on file   Tobacco Use    Smoking status: Never    Smokeless tobacco: Never   Vaping Use    Vaping status: Never Used   Substance and Sexual Activity    Alcohol use: Not on file    Drug use: Not on file    Sexual activity: Yes     Partners: Female   Other Topics Concern    Not on file   Social History Narrative    Not on file     Social Determinants of Health     Financial Resource Strain: Not on file   Food Insecurity: Not on file   Transportation Needs: Not on file    Physical Activity: Not on file   Stress: Not on file   Social Connections: Not on file   Intimate Partner Violence: Not on file   Housing Stability: Not on file           Medications  Allergies   Current Outpatient Medications   Medication Sig Dispense Refill    carvedilol (COREG) 6.25 MG tablet Take 1 tablet (6.25 mg) by mouth 2 times daily (with meals) 60 tablet 4    diphenhydrAMINE (BENADRYL ALLERGY) 25 MG capsule Take 25 mg by mouth every 6 hours as needed      FLUoxetine (PROZAC) 20 MG capsule Take 1 capsule (20 mg) by mouth daily 90 capsule 3    furosemide (LASIX) 20 MG tablet Take 1 tablet (20 mg) by mouth daily 30 tablet 0    lisinopril (ZESTRIL) 5 MG tablet Take 1 tablet (5 mg) by mouth daily 90 tablet 3    predniSONE (DELTASONE) 10 MG tablet Take 4 tablets (40 mg) by mouth daily for 4 days, THEN 3 tablets (30 mg) daily for 4 days, THEN 2 tablets (20 mg) daily for 4 days, THEN 1 tablet (10 mg) daily for 4 days, THEN 0.5 tablets (5 mg) daily for 4 days. 42 tablet 0       Allergies   Allergen Reactions    Fexofenadine-Pseudoephed Er      Other reaction(s): Chest Pain  High BP      Penicillins      Other reaction(s): *Unknown - Childhood Rxn, unknown          Lab Results    Chemistry/lipid CBC Cardiac Enzymes/BNP/TSH/INR   Recent Labs   Lab Test 05/30/23  1440   CHOL 184   HDL 29*      TRIG 273*     Recent Labs   Lab Test 05/30/23  1440 08/04/15  1622    121     Recent Labs   Lab Test 06/06/23  1143      POTASSIUM 4.3   CHLORIDE 103   CO2 26   *   BUN 19.6   CR 1.18*   GFRESTIMATED 72   TJ 9.7     Recent Labs   Lab Test 06/06/23  1143 05/30/23  1440 04/26/21  1134   CR 1.18* 1.33* 1.13     No results for input(s): A1C in the last 79881 hours.       Recent Labs   Lab Test 05/30/23  1440   WBC 6.0   HGB 11.7*   HCT 34.8*   MCV 89        Recent Labs   Lab Test 05/30/23  1440 04/26/21  1134 06/02/20  1511   HGB 11.7* 13.2 12.5*    No results for input(s): TROPONINI in the last  48364 hours.  Recent Labs   Lab Test 06/01/23  1217   NTBNP 1,230*     Recent Labs   Lab Test 05/30/23  1440   TSH 1.30     No results for input(s): INR in the last 44371 hours.     Sakshi Hernandez MD      Thank you for allowing me to participate in the care of your patient.      Sincerely,     Sakshi Hernandez MD     [facility]  cc:   Lui Sweeney MD  [address] [place] [id number]

## 2023-06-15 NOTE — PATIENT INSTRUCTIONS
Mr. Christ Bennett,     It was a pleasure to see you in the office today. My recommendations for you include:   1. Add lisinopril, continue metoprolol and lasix  2. Daily weights, low sodium diet  3. Echocardiogram        Please do not hesitate to call the [facility] clinic with any questions or concerns at [phone].    Sincerely,     Sakshi Hernandez MD

## 2023-06-16 ENCOUNTER — TELEPHONE (OUTPATIENT)
Dept: CARDIOLOGY | Facility: CLINIC | Age: 58
End: 2023-06-16
Payer: COMMERCIAL

## 2023-06-16 NOTE — TELEPHONE ENCOUNTER
Please see patient message below.  Await echo results 6/22/2023.   Follow up is scheduled 9/7/2023.  Thank you,  Nettie

## 2023-06-16 NOTE — TELEPHONE ENCOUNTER
[facility] Call Center    Phone Message    May a detailed message be left on voicemail: yes     Reason for Call: Other: Patient scheduled for echocardiogram 6/22/23. Dr. Hernandez wanted follow up after this test. She is booked out to 9/7/23 for all locations. Patient is scheduled and on a wait list. Just thought I would let her know in case she would like to see the patient sooner.      Action Taken: Other: cardiology    Travel Screening: Not Applicable   Thank you!  [facility]

## 2023-06-22 ENCOUNTER — HOSPITAL ENCOUNTER (OUTPATIENT)
Dept: CARDIOLOGY | Facility: CLINIC | Age: 58
Discharge: HOME | End: 2023-06-22
Attending: INTERNAL MEDICINE | Admitting: INTERNAL MEDICINE
Payer: COMMERCIAL

## 2023-06-22 DIAGNOSIS — I50.21 ACUTE SYSTOLIC CONGESTIVE HEART FAILURE (H): ICD-10-CM

## 2023-06-22 DIAGNOSIS — Z87.74 S/P VSD REPAIR: ICD-10-CM

## 2023-06-22 LAB — LVEF ECHO: NORMAL

## 2023-06-22 PROCEDURE — 999N000208 ECHOCARDIOGRAM COMPLETE

## 2023-06-22 PROCEDURE — 93306 TTE W/DOPPLER COMPLETE: CPT | Mod: 26 | Performed by: INTERNAL MEDICINE

## 2023-06-22 PROCEDURE — 93306 TTE W/DOPPLER COMPLETE: CPT

## 2023-06-23 ENCOUNTER — OFFICE VISIT (OUTPATIENT)
Dept: FAMILY MEDICINE | Facility: CLINIC | Age: 58
End: 2023-06-23
Payer: COMMERCIAL

## 2023-06-23 VITALS
DIASTOLIC BLOOD PRESSURE: 66 MMHG | WEIGHT: 276 LBS | SYSTOLIC BLOOD PRESSURE: 128 MMHG | OXYGEN SATURATION: 95 % | HEIGHT: 72 IN | RESPIRATION RATE: 20 BRPM | TEMPERATURE: 98.6 F | BODY MASS INDEX: 37.38 KG/M2 | HEART RATE: 85 BPM

## 2023-06-23 DIAGNOSIS — M1A.9XX0 CHRONIC GOUT INVOLVING TOE OF LEFT FOOT WITHOUT TOPHUS, UNSPECIFIED CAUSE: ICD-10-CM

## 2023-06-23 DIAGNOSIS — I50.82 BIVENTRICULAR CONGESTIVE HEART FAILURE (H): Primary | ICD-10-CM

## 2023-06-23 PROCEDURE — 99214 OFFICE O/P EST MOD 30 MIN: CPT | Performed by: FAMILY MEDICINE

## 2023-06-23 RX ORDER — PREDNISONE 10 MG/1
TABLET ORAL
Qty: 49 TABLET | Refills: 0 | Status: SHIPPED | OUTPATIENT
Start: 2023-06-23 | End: 2023-07-11

## 2023-06-23 ASSESSMENT — PAIN SCALES - GENERAL: PAINLEVEL: NO PAIN (0)

## 2023-06-23 NOTE — LETTER
June 23, 2023      Christ NELY Sabrina  [address] [place] [address]        To Whom It May Concern:    Christ Ortegatianoah was seen in our clinic 6/23/2023. He may return to work 6/26/2023 with the following: advance activity as tolerated    Sincerely,        Lui Sweeney MD

## 2023-06-23 NOTE — PROGRESS NOTES
Assessment & Plan     Biventricular congestive heart failure (H)  Continue Carvedilol, Lisinopril, Lasix    Chronic gout involving toe of left foot without tophus, unspecified cause  F/U Dr. Ryder in 4 weeks  Increase Prednisone to 20 mg daily with slower taper.        Lui Sweeney MD  [facility]    Javier Polo is a 57 year old, presenting for the following health issues:  Follow Up (CHF and Echo results.)        6/23/2023    12:52 PM   Additional Questions   Roomed by SRud   Accompanied by n/a     History of Present Illness       Heart Failure:  He presents for follow up of heart failure. He is not experiencing shortness of breath at night, with rest or with activity He is experiencing lower extremity edema which is same as usual. He denies orthopenea and is not coughing at night. Patient is checking weight daily. He has recently had a weight decrease. He has no side effects from medications.  He has has a medical visit for heart failure 1 time since the last visit.    He eats 2-3 servings of fruits and vegetables daily.He consumes 0 sweetened beverage(s) daily.He exercises with enough effort to increase his heart rate 10 to 19 minutes per day.  He exercises with enough effort to increase his heart rate 3 or less days per week.   He is taking medications regularly.       Heart Failure Follow-up    Are you experiencing any shortness of breath? No    Are you experiencing any swelling in your legs or feet?  Better than usual    Are you using more pillows than usual? Yes    Do you cough at night?  No    Do you check your weight daily?  Yes    Have you had a weight change recently?  Weight decrease    Are you having any of the following side effects from your medications? (Select all that apply)  The patient does not report symptoms of dizziness, fatigue, cough, swelling, or slow heart beat.    Since your last visit, how many times have you gone to the cardiologist, urgent  "care, emergency room, or hospital because of your heart failure?   2 times          Review of Systems   Constitutional, HEENT, cardiovascular, pulmonary, gi and gu systems are negative, except as otherwise noted.      Objective    /66 (BP Location: Right arm, Patient Position: Sitting)   Pulse 85   Temp 98.6  F (37  C) (Tympanic)   Resp 20   Ht 1.816 m (5' 11.5\")   Wt 125.2 kg (276 lb)   SpO2 95%   BMI 37.96 kg/m    Body mass index is 37.96 kg/m .  Physical Exam   GENERAL: healthy, alert and no distress  NECK: no adenopathy, no asymmetry, masses, or scars and thyroid normal to palpation  RESP: lungs clear to auscultation - no rales, rhonchi or wheezes  CV: regular rates and rhythm, normal S1 S2, no S3 or S4, no murmur, click or rub, peripheral pulses strong and 1  1+ left lower extremity pitting edema to ankle feels his gout is coming back    ABDOMEN: soft, nontender, no hepatosplenomegaly, no masses and bowel sounds normal  MS: no gross musculoskeletal defects noted, no edema                    "

## 2023-07-11 ENCOUNTER — MYC REFILL (OUTPATIENT)
Dept: FAMILY MEDICINE | Facility: CLINIC | Age: 58
End: 2023-07-11
Payer: COMMERCIAL

## 2023-07-11 DIAGNOSIS — I50.82 BIVENTRICULAR CONGESTIVE HEART FAILURE (H): ICD-10-CM

## 2023-07-11 DIAGNOSIS — I50.21 ACUTE SYSTOLIC CONGESTIVE HEART FAILURE (H): ICD-10-CM

## 2023-07-11 DIAGNOSIS — R06.02 SOB (SHORTNESS OF BREATH): ICD-10-CM

## 2023-07-11 RX ORDER — FUROSEMIDE 20 MG
20 TABLET ORAL DAILY
Qty: 30 TABLET | Refills: 0 | Status: SHIPPED | OUTPATIENT
Start: 2023-07-11 | End: 2023-07-28

## 2023-07-11 RX ORDER — PREDNISONE 10 MG/1
TABLET ORAL
Qty: 49 TABLET | Refills: 0 | Status: SHIPPED | OUTPATIENT
Start: 2023-07-11 | End: 2023-08-22

## 2023-07-11 NOTE — TELEPHONE ENCOUNTER
Routing refill request to provider for review/approval because:  Drug not on the [facility] refill protocol   LOV 6/23/2023    Diuretics (Including Combos) Protocol Failed     Normal serum creatinine on file in past 12 months        GUI Farrell  [facility]

## 2023-07-17 ENCOUNTER — OFFICE VISIT (OUTPATIENT)
Dept: FAMILY MEDICINE | Facility: CLINIC | Age: 58
End: 2023-07-17
Payer: COMMERCIAL

## 2023-07-17 VITALS
TEMPERATURE: 97.4 F | OXYGEN SATURATION: 98 % | HEIGHT: 72 IN | BODY MASS INDEX: 37.79 KG/M2 | RESPIRATION RATE: 20 BRPM | HEART RATE: 72 BPM | SYSTOLIC BLOOD PRESSURE: 131 MMHG | DIASTOLIC BLOOD PRESSURE: 76 MMHG | WEIGHT: 279 LBS

## 2023-07-17 DIAGNOSIS — G47.33 OBSTRUCTIVE SLEEP APNEA SYNDROME: ICD-10-CM

## 2023-07-17 DIAGNOSIS — M1A.9XX0 CHRONIC GOUT INVOLVING TOE OF LEFT FOOT WITHOUT TOPHUS, UNSPECIFIED CAUSE: Primary | ICD-10-CM

## 2023-07-17 DIAGNOSIS — Z12.11 SCREEN FOR COLON CANCER: ICD-10-CM

## 2023-07-17 DIAGNOSIS — E66.01 MORBID OBESITY (H): ICD-10-CM

## 2023-07-17 DIAGNOSIS — I50.82 BIVENTRICULAR CONGESTIVE HEART FAILURE (H): ICD-10-CM

## 2023-07-17 PROCEDURE — 99214 OFFICE O/P EST MOD 30 MIN: CPT | Performed by: INTERNAL MEDICINE

## 2023-07-17 RX ORDER — COLCHICINE 0.6 MG/1
0.6 TABLET ORAL DAILY
Qty: 90 TABLET | Refills: 0 | Status: SHIPPED | OUTPATIENT
Start: 2023-07-17 | End: 2023-08-28

## 2023-07-17 RX ORDER — ALLOPURINOL 100 MG/1
100 TABLET ORAL DAILY
Qty: 90 TABLET | Refills: 3 | Status: SHIPPED | OUTPATIENT
Start: 2023-07-17 | End: 2023-09-08

## 2023-07-17 ASSESSMENT — ENCOUNTER SYMPTOMS
COUGH: 0
PALPITATIONS: 0
JOINT SWELLING: 0
BRUISES/BLEEDS EASILY: 1
APNEA: 0
UNEXPECTED WEIGHT CHANGE: 0
SLEEP DISTURBANCE: 0
HEARTBURN: 0
HEADACHES: 0
FATIGUE: 0
CHILLS: 0
SHORTNESS OF BREATH: 0
FEVER: 0

## 2023-07-17 NOTE — PROGRESS NOTES
Assessment & Plan     Screen for colon cancer  Declines today    Biventricular congestive heart failure (H)  Now stabilized    Chronic gout involving toe of left foot without tophus, unspecified cause  Patient with a pre-existing history of gout has had increased symptoms since starting diuretics and therapy for heart failure.  Recent uric acid 8.3.  Asymptomatic on prednisone.  Almost all of his bouts of gout have been podagra.  We will start colchicine and allopurinol daily.  Lab work in 2 weeks with titration of allopurinol as needed.  - allopurinol (ZYLOPRIM) 100 MG tablet; Take 1 tablet (100 mg) by mouth daily for 90 days  - colchicine (COLCRYS) 0.6 MG tablet; Take 1 tablet (0.6 mg) by mouth daily for 90 days  - Basic metabolic panel  (Ca, Cl, CO2, Creat, Gluc, K, Na, BUN); Future  - Uric acid; Future    Morbid obesity (H)  Discussed the importance of weight loss.  Patient declined weight management referral.    Obstructive sleep apnea syndrome  Patient uses his CPAP every night.                   Velasquez Ryder MD  [facility]    Javier Polo is a 57 year old, presenting for the following health issues:  Arthritis (Pt c/o gout in left foot) and Heart Failure (Pt here for Follow-up on his gout and CHF.)    Patient here because of recent increase symptoms of gout.  Had a recent diagnosis of congestive heart failure and was started on therapy with several episodes of podagra.  He has a history of podagra going back a number of years.  Currently feeling well.  He is on a taper of prednisone.  He had the bunion left great toe aspirated by podiatry 7/10/2023 and he describes being told that it was a tophus from the sound of it.      6/23/2023    12:52 PM   Additional Questions   Roomed by SRud   Accompanied by n/a     Arthritis  Pertinent negatives include no chest pain, chills, coughing, fatigue, fever, headaches or joint swelling.                Review of Systems  "  Constitutional: Negative for chills, fatigue, fever and unexpected weight change.   Respiratory: Negative for apnea, cough and shortness of breath.    Cardiovascular: Negative for chest pain, palpitations and peripheral edema.   Gastrointestinal: Negative for heartburn.   Musculoskeletal: Positive for arthritis. Negative for joint swelling.   Neurological: Negative for headaches.   Hematological: Bruises/bleeds easily.   Psychiatric/Behavioral: Negative for sleep disturbance.            Objective    /76 (BP Location: Right arm, Patient Position: Sitting, Cuff Size: Adult Large)   Pulse 72   Temp 97.4  F (36.3  C) (Tympanic)   Resp 20   Ht 1.816 m (5' 11.5\")   Wt 126.6 kg (279 lb)   SpO2 98%   BMI 38.37 kg/m    Body mass index is 38.37 kg/m .  Physical Exam   Obese man in no distress  HEENT exam is Mallampati 1  Neck thick  Lungs clear  Cardiac exam regular no murmur, trace edema  Alert and oriented, cranial is normal, great gait normal  Normal mood and affect  No hot joints though he does have a bunion left great toe.                  "

## 2023-07-24 ENCOUNTER — OFFICE VISIT (OUTPATIENT)
Dept: CARDIOLOGY | Facility: CLINIC | Age: 58
End: 2023-07-24
Payer: COMMERCIAL

## 2023-07-24 ENCOUNTER — APPOINTMENT (OUTPATIENT)
Dept: CARDIOLOGY | Facility: CLINIC | Age: 58
End: 2023-07-24
Payer: COMMERCIAL

## 2023-07-24 VITALS
RESPIRATION RATE: 16 BRPM | DIASTOLIC BLOOD PRESSURE: 74 MMHG | OXYGEN SATURATION: 97 % | WEIGHT: 279 LBS | BODY MASS INDEX: 38.37 KG/M2 | HEART RATE: 85 BPM | SYSTOLIC BLOOD PRESSURE: 118 MMHG

## 2023-07-24 DIAGNOSIS — G47.33 OBSTRUCTIVE SLEEP APNEA SYNDROME: ICD-10-CM

## 2023-07-24 DIAGNOSIS — I10 PRIMARY HYPERTENSION: ICD-10-CM

## 2023-07-24 DIAGNOSIS — I50.21 ACUTE SYSTOLIC CONGESTIVE HEART FAILURE (H): ICD-10-CM

## 2023-07-24 DIAGNOSIS — I50.32 CHRONIC HEART FAILURE WITH PRESERVED EJECTION FRACTION (H): Primary | ICD-10-CM

## 2023-07-24 DIAGNOSIS — E66.01 MORBID OBESITY (H): ICD-10-CM

## 2023-07-24 LAB
ANION GAP SERPL CALCULATED.3IONS-SCNC: 13 MMOL/L (ref 5–18)
BUN SERPL-MCNC: 27 MG/DL (ref 8–22)
CALCIUM SERPL-MCNC: 10.7 MG/DL (ref 8.5–10.5)
CHLORIDE BLD-SCNC: 102 MMOL/L (ref 98–107)
CO2 SERPL-SCNC: 27 MMOL/L (ref 22–31)
CREAT SERPL-MCNC: 1.6 MG/DL (ref 0.7–1.3)
GFR SERPL CREATININE-BSD FRML MDRD: 50 ML/MIN/1.73M2
GLUCOSE BLD-MCNC: 103 MG/DL (ref 70–125)
POTASSIUM BLD-SCNC: 4.7 MMOL/L (ref 3.5–5)
SODIUM SERPL-SCNC: 142 MMOL/L (ref 136–145)

## 2023-07-24 PROCEDURE — 80048 BASIC METABOLIC PNL TOTAL CA: CPT | Performed by: NURSE PRACTITIONER

## 2023-07-24 PROCEDURE — 36415 COLL VENOUS BLD VENIPUNCTURE: CPT | Performed by: NURSE PRACTITIONER

## 2023-07-24 PROCEDURE — 99214 OFFICE O/P EST MOD 30 MIN: CPT | Performed by: NURSE PRACTITIONER

## 2023-07-24 RX ORDER — LISINOPRIL 5 MG/1
5 TABLET ORAL DAILY
Qty: 90 TABLET | Refills: 3 | Status: SHIPPED | OUTPATIENT
Start: 2023-07-24 | End: 2024-04-23

## 2023-07-24 NOTE — PATIENT INSTRUCTIONS
Christ Bennett,    It was a pleasure to see you today at [facility].     My recommendations after this visit include:  - Please follow up with Dr Hernandez September 7   - I have checked labs  - Continue current medications    Bree Maya, CNP

## 2023-07-24 NOTE — PROGRESS NOTES
Assessment/Recommendations   Assessment:    1. Heart failure with preserved ejection fraction, NYHA class II: Compensated.  He states he is feeling well.  His fatigue, dyspnea on exertion and lower extremity edema have improved.  We discussed monitoring symptoms, following a low-sodium diet, monitoring daily weights and his echocardiogram results.  2.  Hypertension: Controlled.  Blood pressure 118/74  3.  LISBETH: Wears CPAP nightly  4.  Morbid obesity: BMI 38.  He is working on weight loss    Plan:  1.  BMP pending  2.  Continue current medications  3.  Continue wearing CPAP  4.  Continue monitoring weights and following a low-sodium diet  5.  Continue to work on weight loss    Christ Bennett will follow up with Dr. Hernandez September 7.     History of Present Illness/Subjective    Mr. Christ Bennett is a 57 year old male seen at [facility] heart failure clinic today for continued follow-up.  He follows up for heart failure with preserved ejection fraction.  He had an echocardiogram June 22, 2023 which showed ejection fraction of 55 to 60%.  He has a past medical history significant for obesity, hypertension, gout, LISBETH on CPAP, depression, history of DVT, likely ASD repair.    During the last clinic visit, Dr. Hernandez started him on lisinopril and carvedilol.  Today, he states he is feeling well.  He denies lightheadedness, shortness of breath, orthopnea, PND, palpitations, chest pain, and abdominal fullness/bloating.      He is monitoring home weights which are stable between 275-280 pounds.  He is following a low sodium diet.      ECHOCARDIOGRAM: 6/22/2023  Interpretation Summary  Left ventricular size, wall motion and function are normal. The ejection  fraction is 55-60%.  Global right ventricular function is normal.  Moderate biatrial enlargement.  There appears to be prosthetic material in the atrial septum, likely ASD  repair.  There was no shunt at the atrial septal level as assessed by agitated  saline  bubble study at rest and with Valsalva maneuver.  No hemodynamically significant valve abnormalities.  The inferior vena cava is normal.        Physical Examination Review of Systems   /74 (BP Location: Left arm, Patient Position: Sitting, Cuff Size: Adult Large)   Pulse 85   Resp 16   Wt 126.6 kg (279 lb)   SpO2 97%   BMI 38.37 kg/m    Body mass index is 38.37 kg/m .  Wt Readings from Last 3 Encounters:   07/24/23 126.6 kg (279 lb)   07/17/23 126.6 kg (279 lb)   06/23/23 125.2 kg (276 lb)       General Appearance:   no acute distress   ENT/Mouth: No abnormalities   EYES:  no scleral icterus, normal conjunctivae   Neck: no thyromegaly   Chest/Lungs:   lungs are clear to auscultation, no rales or wheezing, equal chest wall expansion    Cardiovascular:   Regular. Normal first and second heart sounds with no murmurs, rubs, or gallops, trace edema bilaterally    Abdomen:  bowel sounds are present   Extremities: no cyanosis or clubbing   Skin: warm   Neurologic: no tremors     Psychiatric: alert and oriented x3                                              Medical History  Surgical History Family History Social History   Past Medical History:   Diagnosis Date    Congestive heart failure (H)     Hypertension     Obese     Sleep apnea     Past Surgical History:   Procedure Laterality Date    REPAIR ATRIAL SEPTAL DEFECT      No family history on file. Social History     Socioeconomic History    Marital status:      Spouse name: Not on file    Number of children: Not on file    Years of education: Not on file    Highest education level: Not on file   Occupational History    Not on file   Tobacco Use    Smoking status: Never    Smokeless tobacco: Never   Vaping Use    Vaping Use: Never used   Substance and Sexual Activity    Alcohol use: Not on file    Drug use: Not on file    Sexual activity: Yes     Partners: Female   Other Topics Concern    Not on file   Social History Narrative    Not on file      Social Determinants of Health     Financial Resource Strain: Not on file   Food Insecurity: Not on file   Transportation Needs: Not on file   Physical Activity: Not on file   Stress: Not on file   Social Connections: Not on file   Intimate Partner Violence: Not on file   Housing Stability: Not on file          Medications  Allergies   Current Outpatient Medications   Medication Sig Dispense Refill    allopurinol (ZYLOPRIM) 100 MG tablet Take 1 tablet (100 mg) by mouth daily for 90 days 90 tablet 3    carvedilol (COREG) 6.25 MG tablet Take 1 tablet (6.25 mg) by mouth 2 times daily (with meals) 60 tablet 4    colchicine (COLCRYS) 0.6 MG tablet Take 1 tablet (0.6 mg) by mouth daily for 90 days 90 tablet 0    diphenhydrAMINE (BENADRYL ALLERGY) 25 MG capsule Take 25 mg by mouth every 6 hours as needed      FLUoxetine (PROZAC) 20 MG capsule Take 1 capsule (20 mg) by mouth daily 90 capsule 3    furosemide (LASIX) 20 MG tablet Take 1 tablet (20 mg) by mouth daily 30 tablet 0    lisinopril (ZESTRIL) 5 MG tablet Take 1 tablet (5 mg) by mouth daily 90 tablet 3    predniSONE (DELTASONE) 10 MG tablet Take 2 tablets (20 mg) by mouth daily for 14 days, THEN 1 tablet (10 mg) daily for 14 days, THEN 0.5 tablets (5 mg) daily for 14 days. 49 tablet 0    Allergies   Allergen Reactions    Fexofenadine-Pseudoephed Er      Other reaction(s): Chest Pain  High BP      Penicillins      Other reaction(s): *Unknown - Childhood Rxn, unknown         Lab Results    Chemistry/lipid CBC Cardiac Enzymes/BNP/TSH/INR   Lab Results   Component Value Date    CHOL 184 05/30/2023    HDL 29 (L) 05/30/2023    TRIG 273 (H) 05/30/2023    BUN 19.6 06/06/2023     06/06/2023    CO2 26 06/06/2023    Lab Results   Component Value Date    WBC 6.0 05/30/2023    HGB 11.7 (L) 05/30/2023    HCT 34.8 (L) 05/30/2023    MCV 89 05/30/2023     05/30/2023    Lab Results   Component Value Date    TSH 1.30 05/30/2023             This note has been dictated  using voice recognition software. Any grammatical, typographical, or context distortions are unintentional and inherent to the software    30 minutes spent on the date of encounter doing chart review, review of outside records, review of test results, interpretation with above tests, patient visit, and documentation.

## 2023-07-24 NOTE — LETTER
7/24/2023    Lui Sweeney MD  [address] [facility] [address]    RE: Christ Bennett       Dear Colleague,     I had the pleasure of seeing Christ Bennett in the [facility].        Assessment/Recommendations   Assessment:    1. Heart failure with preserved ejection fraction, NYHA class II: Compensated.  He states he is feeling well.  His fatigue, dyspnea on exertion and lower extremity edema have improved.  We discussed monitoring symptoms, following a low-sodium diet, monitoring daily weights and his echocardiogram results.  2.  Hypertension: Controlled.  Blood pressure 118/74  3.  LISBETH: Wears CPAP nightly  4.  Morbid obesity: BMI 38.  He is working on weight loss    Plan:  1.  BMP pending  2.  Continue current medications  3.  Continue wearing CPAP  4.  Continue monitoring weights and following a low-sodium diet  5.  Continue to work on weight loss    Christ Bennett will follow up with Dr. Hernandez September 7.     History of Present Illness/Subjective    Mr. Christ Bennett is a 57 year old male seen at [facility] heart failure clinic today for continued follow-up.  He follows up for heart failure with preserved ejection fraction.  He had an echocardiogram June 22, 2023 which showed ejection fraction of 55 to 60%.  He has a past medical history significant for obesity, hypertension, gout, LISBETH on CPAP, depression, history of DVT, likely ASD repair.    During the last clinic visit, Dr. Hernandez started him on lisinopril and carvedilol.  Today, he states he is feeling well.  He denies lightheadedness, shortness of breath, orthopnea, PND, palpitations, chest pain, and abdominal fullness/bloating.      He is monitoring home weights which are stable between 275-280 pounds.  He is following a low sodium diet.      ECHOCARDIOGRAM: 6/22/2023  Interpretation Summary  Left ventricular size, wall motion and function are normal. The ejection  fraction is 55-60%.  Global right ventricular  function is normal.  Moderate biatrial enlargement.  There appears to be prosthetic material in the atrial septum, likely ASD  repair.  There was no shunt at the atrial septal level as assessed by agitated saline  bubble study at rest and with Valsalva maneuver.  No hemodynamically significant valve abnormalities.  The inferior vena cava is normal.        Physical Examination Review of Systems   /74 (BP Location: Left arm, Patient Position: Sitting, Cuff Size: Adult Large)   Pulse 85   Resp 16   Wt 126.6 kg (279 lb)   SpO2 97%   BMI 38.37 kg/m    Body mass index is 38.37 kg/m .  Wt Readings from Last 3 Encounters:   07/24/23 126.6 kg (279 lb)   07/17/23 126.6 kg (279 lb)   06/23/23 125.2 kg (276 lb)       General Appearance:   no acute distress   ENT/Mouth: No abnormalities   EYES:  no scleral icterus, normal conjunctivae   Neck: no thyromegaly   Chest/Lungs:   lungs are clear to auscultation, no rales or wheezing, equal chest wall expansion    Cardiovascular:   Regular. Normal first and second heart sounds with no murmurs, rubs, or gallops, trace edema bilaterally    Abdomen:  bowel sounds are present   Extremities: no cyanosis or clubbing   Skin: warm   Neurologic: no tremors     Psychiatric: alert and oriented x3                                              Medical History  Surgical History Family History Social History   Past Medical History:   Diagnosis Date    Congestive heart failure (H)     Hypertension     Obese     Sleep apnea     Past Surgical History:   Procedure Laterality Date    REPAIR ATRIAL SEPTAL DEFECT      No family history on file. Social History     Socioeconomic History    Marital status:      Spouse name: Not on file    Number of children: Not on file    Years of education: Not on file    Highest education level: Not on file   Occupational History    Not on file   Tobacco Use    Smoking status: Never    Smokeless tobacco: Never   Vaping Use    Vaping Use: Never used    Substance and Sexual Activity    Alcohol use: Not on file    Drug use: Not on file    Sexual activity: Yes     Partners: Female   Other Topics Concern    Not on file   Social History Narrative    Not on file     Social Determinants of Health     Financial Resource Strain: Not on file   Food Insecurity: Not on file   Transportation Needs: Not on file   Physical Activity: Not on file   Stress: Not on file   Social Connections: Not on file   Intimate Partner Violence: Not on file   Housing Stability: Not on file          Medications  Allergies   Current Outpatient Medications   Medication Sig Dispense Refill    allopurinol (ZYLOPRIM) 100 MG tablet Take 1 tablet (100 mg) by mouth daily for 90 days 90 tablet 3    carvedilol (COREG) 6.25 MG tablet Take 1 tablet (6.25 mg) by mouth 2 times daily (with meals) 60 tablet 4    colchicine (COLCRYS) 0.6 MG tablet Take 1 tablet (0.6 mg) by mouth daily for 90 days 90 tablet 0    diphenhydrAMINE (BENADRYL ALLERGY) 25 MG capsule Take 25 mg by mouth every 6 hours as needed      FLUoxetine (PROZAC) 20 MG capsule Take 1 capsule (20 mg) by mouth daily 90 capsule 3    furosemide (LASIX) 20 MG tablet Take 1 tablet (20 mg) by mouth daily 30 tablet 0    lisinopril (ZESTRIL) 5 MG tablet Take 1 tablet (5 mg) by mouth daily 90 tablet 3    predniSONE (DELTASONE) 10 MG tablet Take 2 tablets (20 mg) by mouth daily for 14 days, THEN 1 tablet (10 mg) daily for 14 days, THEN 0.5 tablets (5 mg) daily for 14 days. 49 tablet 0    Allergies   Allergen Reactions    Fexofenadine-Pseudoephed Er      Other reaction(s): Chest Pain  High BP      Penicillins      Other reaction(s): *Unknown - Childhood Rxn, unknown         Lab Results    Chemistry/lipid CBC Cardiac Enzymes/BNP/TSH/INR   Lab Results   Component Value Date    CHOL 184 05/30/2023    HDL 29 (L) 05/30/2023    TRIG 273 (H) 05/30/2023    BUN 19.6 06/06/2023     06/06/2023    CO2 26 06/06/2023    Lab Results   Component Value Date    WBC 6.0  05/30/2023    HGB 11.7 (L) 05/30/2023    HCT 34.8 (L) 05/30/2023    MCV 89 05/30/2023     05/30/2023    Lab Results   Component Value Date    TSH 1.30 05/30/2023             This note has been dictated using voice recognition software. Any grammatical, typographical, or context distortions are unintentional and inherent to the software    30 minutes spent on the date of encounter doing chart review, review of outside records, review of test results, interpretation with above tests, patient visit, and documentation.                Thank you for allowing me to participate in the care of your patient.      Sincerely,     JAMIR [facility]  cc:   No referring provider defined for this encounter.

## 2023-07-28 ENCOUNTER — MYC REFILL (OUTPATIENT)
Dept: FAMILY MEDICINE | Facility: CLINIC | Age: 58
End: 2023-07-28
Payer: COMMERCIAL

## 2023-07-28 DIAGNOSIS — I50.21 ACUTE SYSTOLIC CONGESTIVE HEART FAILURE (H): ICD-10-CM

## 2023-07-28 DIAGNOSIS — R06.02 SOB (SHORTNESS OF BREATH): ICD-10-CM

## 2023-07-31 RX ORDER — FUROSEMIDE 20 MG
20 TABLET ORAL DAILY
Qty: 30 TABLET | Refills: 0 | Status: SHIPPED | OUTPATIENT
Start: 2023-07-31 | End: 2023-08-29

## 2023-07-31 NOTE — TELEPHONE ENCOUNTER
"          Last office visit: 7/17/2023     Future Appointments 7/31/2023 - 1/27/2024        Date Visit Type Length Department Provider     9/7/2023  2:50 PM RETURN CARDIOLOGY 30 min [facility] HE[facility] CARE  Sakshi Hernandez MD    Location Instructions:     We are located at [address] [facility]. We offer free parking in our on-site lot. Please check in at the , located straight ahead of the clinic entrance.                     Requested Prescriptions   Pending Prescriptions Disp Refills    furosemide (LASIX) 20 MG tablet 30 tablet 0     Sig: Take 1 tablet (20 mg) by mouth daily       Diuretics (Including Combos) Protocol Failed - 7/28/2023  6:32 AM        Failed - Normal serum creatinine on file in past 12 months     Recent Labs   Lab Test 07/24/23  1449   CR 1.60*              Passed - Blood pressure under 140/90 in past 12 months     BP Readings from Last 3 Encounters:   07/24/23 118/74   07/17/23 131/76   06/23/23 128/66                 Passed - Recent (12 mo) or future (30 days) visit within the authorizing provider's specialty     Patient has had an office visit with the authorizing provider or a provider within the authorizing providers department within the previous 12 mos or has a future within next 30 days. See \"Patient Info\" tab in inbasket, or \"Choose Columns\" in Meds & Orders section of the refill encounter.              Passed - Medication is active on med list        Passed - Patient is age 18 or older        Passed - Normal serum potassium on file in past 12 months     Recent Labs   Lab Test 07/24/23  1449   POTASSIUM 4.7                    Passed - Normal serum sodium on file in past 12 months     Recent Labs   Lab Test 07/24/23  1449                            "

## 2023-08-28 ENCOUNTER — MYC REFILL (OUTPATIENT)
Dept: FAMILY MEDICINE | Facility: CLINIC | Age: 58
End: 2023-08-28
Payer: COMMERCIAL

## 2023-08-28 DIAGNOSIS — M1A.9XX0 CHRONIC GOUT INVOLVING TOE OF LEFT FOOT WITHOUT TOPHUS, UNSPECIFIED CAUSE: ICD-10-CM

## 2023-08-30 RX ORDER — COLCHICINE 0.6 MG/1
0.6 TABLET ORAL DAILY
Qty: 30 TABLET | Refills: 1 | Status: SHIPPED | OUTPATIENT
Start: 2023-08-30

## 2023-09-07 ENCOUNTER — OFFICE VISIT (OUTPATIENT)
Dept: CARDIOLOGY | Facility: CLINIC | Age: 58
End: 2023-09-07
Attending: INTERNAL MEDICINE
Payer: COMMERCIAL

## 2023-09-07 VITALS
BODY MASS INDEX: 39.18 KG/M2 | RESPIRATION RATE: 16 BRPM | HEART RATE: 71 BPM | DIASTOLIC BLOOD PRESSURE: 80 MMHG | OXYGEN SATURATION: 96 % | WEIGHT: 289.3 LBS | HEIGHT: 72 IN | SYSTOLIC BLOOD PRESSURE: 127 MMHG

## 2023-09-07 DIAGNOSIS — I50.32 CHRONIC HEART FAILURE WITH PRESERVED EJECTION FRACTION (H): ICD-10-CM

## 2023-09-07 DIAGNOSIS — R06.02 SOB (SHORTNESS OF BREATH): ICD-10-CM

## 2023-09-07 DIAGNOSIS — I50.21 ACUTE SYSTOLIC CONGESTIVE HEART FAILURE (H): ICD-10-CM

## 2023-09-07 DIAGNOSIS — M1A.9XX0 CHRONIC GOUT INVOLVING TOE OF LEFT FOOT WITHOUT TOPHUS, UNSPECIFIED CAUSE: ICD-10-CM

## 2023-09-07 LAB
ANION GAP SERPL CALCULATED.3IONS-SCNC: 13 MMOL/L (ref 7–15)
BUN SERPL-MCNC: 19.5 MG/DL (ref 6–20)
CALCIUM SERPL-MCNC: 10.1 MG/DL (ref 8.6–10)
CHLORIDE SERPL-SCNC: 104 MMOL/L (ref 98–107)
CREAT SERPL-MCNC: 1.23 MG/DL (ref 0.67–1.17)
DEPRECATED HCO3 PLAS-SCNC: 25 MMOL/L (ref 22–29)
EGFRCR SERPLBLD CKD-EPI 2021: 68 ML/MIN/1.73M2
GLUCOSE SERPL-MCNC: 89 MG/DL (ref 70–99)
POTASSIUM SERPL-SCNC: 4.6 MMOL/L (ref 3.4–5.3)
SODIUM SERPL-SCNC: 142 MMOL/L (ref 136–145)
URATE SERPL-MCNC: 8.9 MG/DL (ref 3.4–7)

## 2023-09-07 PROCEDURE — 36415 COLL VENOUS BLD VENIPUNCTURE: CPT | Performed by: INTERNAL MEDICINE

## 2023-09-07 PROCEDURE — 99214 OFFICE O/P EST MOD 30 MIN: CPT | Performed by: INTERNAL MEDICINE

## 2023-09-07 PROCEDURE — 80048 BASIC METABOLIC PNL TOTAL CA: CPT | Performed by: INTERNAL MEDICINE

## 2023-09-07 PROCEDURE — 83880 ASSAY OF NATRIURETIC PEPTIDE: CPT | Performed by: INTERNAL MEDICINE

## 2023-09-07 PROCEDURE — 84550 ASSAY OF BLOOD/URIC ACID: CPT | Performed by: INTERNAL MEDICINE

## 2023-09-07 NOTE — LETTER
9/7/2023    Lui Sweeney MD  [address] [facility] [address]    RE: Christ Bennett       Dear Colleague,     I had the pleasure of seeing Christ Bennett in the [facility].    HEART CARE ENCOUNTER CONSULTATON NOTE      [facility]  [phone]      Assessment/Recommendations   Assessment:  1.  Chronic heart failure with preserved ejection fraction: Now with a weight gain of 8 pounds increased edema.  Recommend increasing Lasix dose to twice a day for the next 3 days and then going back to once daily.  Recommend better compliance with low-sodium diet, daily weights.  2. History of heart surgery possibly VSD repair  3. LISBETH on CPAP  4.  History of DVT  5.  Gout     Plan:  1.  Increase Lasix to twice a day for 3 days and then go back to once daily.  Recommend low-sodium diet, daily weights.  2. Continue lisinopril 5 mg daily and carvedilol 6.25 mg twice daily.  Consider adding spironolactone.  Hold off at this time due to worsening kidney function.  3.  Check BNP and BMP today.    4.  CT coronary angiography and follow-up with me in 6 months         History of Present Illness/Subjective    HPI: Christ Bennett is a 57 year old male with history of LISBETH on CPAP, DVT, cardiac surgery (possibly VSD repair) at 6/8 years old, obesity who I am seeing for follow-up appointment.  I saw him for initial visit back in June for new diagnosis of heart failure with preserved ejection fraction.  He had noticed worsening dyspnea with weight gain of 15 pounds and elevated BNP of 1230.  He has improved on Lasix.  Creatinine has increased with this.  More recently he admits to noncompliance with low-sodium diet and has had 8 pound weight gain in about a week and increased swelling.  No complaints of chest pain.  He has dyspnea on exertion but not as bad as it was previously.       Physical Examination  Review of Systems   Vitals: /80 (BP Location: Left arm, Patient Position:  "Sitting, Cuff Size: Adult Large)   Pulse 71   Resp 16   Ht 1.816 m (5' 11.5\")   Wt 131.2 kg (289 lb 4.8 oz)   SpO2 96%   BMI 39.79 kg/m    BMI= Body mass index is 39.79 kg/m .  Wt Readings from Last 3 Encounters:   09/07/23 131.2 kg (289 lb 4.8 oz)   07/24/23 126.6 kg (279 lb)   07/17/23 126.6 kg (279 lb)       General Appearance:   no distress, normal body habitus   ENT/Mouth: membranes moist, no oral lesions or bleeding gums.      EYES:  no scleral icterus, normal conjunctivae   Neck: no carotid bruits or thyromegaly   Chest/Lungs:   lungs are clear to auscultation   Cardiovascular:   Regular. Normal first and second heart sounds with no murmurs ++ edema bilaterally    Abdomen:  bowel sounds are present   Extremities: no cyanosis or clubbing   Skin: no xanthelasma, warm.    Neurologic: normal  bilateral, no tremors     Psychiatric: alert and oriented x3, calm        Please refer above for cardiac ROS details.        Medical History  Surgical History Family History Social History   Past Medical History:   Diagnosis Date    Congestive heart failure (H)     Hypertension     Obese     Sleep apnea    DVT Past Surgical History:   Procedure Laterality Date    REPAIR ATRIAL SEPTAL DEFECT           No family history of heart disease Social History     Socioeconomic History    Marital status:      Spouse name: Not on file    Number of children: Not on file    Years of education: Not on file    Highest education level: Not on file   Occupational History    Not on file   Tobacco Use    Smoking status: Never    Smokeless tobacco: Never   Vaping Use    Vaping Use: Never used   Substance and Sexual Activity    Alcohol use: Not on file    Drug use: Not on file    Sexual activity: Yes     Partners: Female   Other Topics Concern    Not on file   Social History Narrative    Not on file     Social Determinants of Health     Financial Resource Strain: Not on file   Food Insecurity: Not on file   Transportation Needs: " Not on file   Physical Activity: Not on file   Stress: Not on file   Social Connections: Not on file   Intimate Partner Violence: Not on file   Housing Stability: Not on file           Medications  Allergies   Current Outpatient Medications   Medication Sig Dispense Refill    allopurinol (ZYLOPRIM) 100 MG tablet Take 1 tablet (100 mg) by mouth daily for 90 days 90 tablet 3    carvedilol (COREG) 6.25 MG tablet Take 1 tablet (6.25 mg) by mouth 2 times daily (with meals) 60 tablet 4    colchicine (COLCRYS) 0.6 MG tablet Take 1 tablet (0.6 mg) by mouth daily 30 tablet 1    diphenhydrAMINE (BENADRYL ALLERGY) 25 MG capsule Take 25 mg by mouth every 6 hours as needed      FLUoxetine (PROZAC) 20 MG capsule Take 1 capsule (20 mg) by mouth daily 90 capsule 3    furosemide (LASIX) 20 MG tablet TAKE 1 TABLET(20 MG) BY MOUTH DAILY 30 tablet 0    lisinopril (ZESTRIL) 5 MG tablet Take 1 tablet (5 mg) by mouth daily 90 tablet 3       Allergies   Allergen Reactions    Fexofenadine-Pseudoephed Er      Other reaction(s): Chest Pain  High BP      Penicillins      Other reaction(s): *Unknown - Childhood Rxn, unknown          Lab Results    Chemistry/lipid CBC Cardiac Enzymes/BNP/TSH/INR   Recent Labs   Lab Test 05/30/23  1440   CHOL 184   HDL 29*      TRIG 273*     Recent Labs   Lab Test 05/30/23  1440 08/04/15  1622    121     Recent Labs   Lab Test 07/24/23  1449      POTASSIUM 4.7   CHLORIDE 102   CO2 27      BUN 27*   CR 1.60*   GFRESTIMATED 50*   TJ 10.7*     Recent Labs   Lab Test 07/24/23  1449 06/06/23  1143 05/30/23  1440   CR 1.60* 1.18* 1.33*     No results for input(s): A1C in the last 72305 hours.       Recent Labs   Lab Test 05/30/23  1440   WBC 6.0   HGB 11.7*   HCT 34.8*   MCV 89        Recent Labs   Lab Test 05/30/23  1440 04/26/21  1134 06/02/20  1511   HGB 11.7* 13.2 12.5*    No results for input(s): TROPONINI in the last 47499 hours.  Recent Labs   Lab Test 06/01/23  1217   NTBNP  1,230*     Recent Labs   Lab Test 05/30/23  1440   TSH 1.30     No results for input(s): INR in the last 37257 hours.     Sakshi Hernandez MD                Thank you for allowing me to participate in the care of your patient.      Sincerely,     Sakshi Hernandez MD     [facility]  cc:   Sakshi Hernandez MD  [address]  [place] [address]

## 2023-09-07 NOTE — PATIENT INSTRUCTIONS
Mr. Christ Bennett,     It was a pleasure to see you in the office today. My recommendations for you include:   1. CT coronary angiogram to check your blood vessels for blockages  2. Increase lasix to twice a day for three days then back to once a day.  Records weights with date, low sodium diet, regular exercise  3. Labs today     Please do not hesitate to call the [facility] clinic with any questions or concerns at [phone].    Sincerely,     Sakshi Hernandez MD

## 2023-09-07 NOTE — PROGRESS NOTES
"  HEART CARE ENCOUNTER CONSULTATON NOTE      [facility]  [phone]      Assessment/Recommendations   Assessment:  1.  Chronic heart failure with preserved ejection fraction: Now with a weight gain of 8 pounds increased edema.  Recommend increasing Lasix dose to twice a day for the next 3 days and then going back to once daily.  Recommend better compliance with low-sodium diet, daily weights.  2. History of heart surgery possibly VSD repair  3. LISBETH on CPAP  4.  History of DVT  5.  Gout     Plan:  1.  Increase Lasix to twice a day for 3 days and then go back to once daily.  Recommend low-sodium diet, daily weights.  2. Continue lisinopril 5 mg daily and carvedilol 6.25 mg twice daily.  Consider adding spironolactone.  Hold off at this time due to worsening kidney function.  3.  Check BNP and BMP today.    4.  CT coronary angiography and follow-up with me in 6 months         History of Present Illness/Subjective    HPI: Christ Bennett is a 57 year old male with history of LISBETH on CPAP, DVT, cardiac surgery (possibly VSD repair) at 6/8 years old, obesity who I am seeing for follow-up appointment.  I saw him for initial visit back in June for new diagnosis of heart failure with preserved ejection fraction.  He had noticed worsening dyspnea with weight gain of 15 pounds and elevated BNP of 1230.  He has improved on Lasix.  Creatinine has increased with this.  More recently he admits to noncompliance with low-sodium diet and has had 8 pound weight gain in about a week and increased swelling.  No complaints of chest pain.  He has dyspnea on exertion but not as bad as it was previously.       Physical Examination  Review of Systems   Vitals: /80 (BP Location: Left arm, Patient Position: Sitting, Cuff Size: Adult Large)   Pulse 71   Resp 16   Ht 1.816 m (5' 11.5\")   Wt 131.2 kg (289 lb 4.8 oz)   SpO2 96%   BMI 39.79 kg/m    BMI= Body mass index is 39.79 kg/m .  Wt Readings from Last 3 " Encounters:   09/07/23 131.2 kg (289 lb 4.8 oz)   07/24/23 126.6 kg (279 lb)   07/17/23 126.6 kg (279 lb)       General Appearance:   no distress, normal body habitus   ENT/Mouth: membranes moist, no oral lesions or bleeding gums.      EYES:  no scleral icterus, normal conjunctivae   Neck: no carotid bruits or thyromegaly   Chest/Lungs:   lungs are clear to auscultation   Cardiovascular:   Regular. Normal first and second heart sounds with no murmurs ++ edema bilaterally    Abdomen:  bowel sounds are present   Extremities: no cyanosis or clubbing   Skin: no xanthelasma, warm.    Neurologic: normal  bilateral, no tremors     Psychiatric: alert and oriented x3, calm        Please refer above for cardiac ROS details.        Medical History  Surgical History Family History Social History   Past Medical History:   Diagnosis Date    Congestive heart failure (H)     Hypertension     Obese     Sleep apnea    DVT Past Surgical History:   Procedure Laterality Date    REPAIR ATRIAL SEPTAL DEFECT           No family history of heart disease Social History     Socioeconomic History    Marital status:      Spouse name: Not on file    Number of children: Not on file    Years of education: Not on file    Highest education level: Not on file   Occupational History    Not on file   Tobacco Use    Smoking status: Never    Smokeless tobacco: Never   Vaping Use    Vaping Use: Never used   Substance and Sexual Activity    Alcohol use: Not on file    Drug use: Not on file    Sexual activity: Yes     Partners: Female   Other Topics Concern    Not on file   Social History Narrative    Not on file     Social Determinants of Health     Financial Resource Strain: Not on file   Food Insecurity: Not on file   Transportation Needs: Not on file   Physical Activity: Not on file   Stress: Not on file   Social Connections: Not on file   Intimate Partner Violence: Not on file   Housing Stability: Not on file           Medications  Allergies    Current Outpatient Medications   Medication Sig Dispense Refill    allopurinol (ZYLOPRIM) 100 MG tablet Take 1 tablet (100 mg) by mouth daily for 90 days 90 tablet 3    carvedilol (COREG) 6.25 MG tablet Take 1 tablet (6.25 mg) by mouth 2 times daily (with meals) 60 tablet 4    colchicine (COLCRYS) 0.6 MG tablet Take 1 tablet (0.6 mg) by mouth daily 30 tablet 1    diphenhydrAMINE (BENADRYL ALLERGY) 25 MG capsule Take 25 mg by mouth every 6 hours as needed      FLUoxetine (PROZAC) 20 MG capsule Take 1 capsule (20 mg) by mouth daily 90 capsule 3    furosemide (LASIX) 20 MG tablet TAKE 1 TABLET(20 MG) BY MOUTH DAILY 30 tablet 0    lisinopril (ZESTRIL) 5 MG tablet Take 1 tablet (5 mg) by mouth daily 90 tablet 3       Allergies   Allergen Reactions    Fexofenadine-Pseudoephed Er      Other reaction(s): Chest Pain  High BP      Penicillins      Other reaction(s): *Unknown - Childhood Rxn, unknown          Lab Results    Chemistry/lipid CBC Cardiac Enzymes/BNP/TSH/INR   Recent Labs   Lab Test 05/30/23  1440   CHOL 184   HDL 29*      TRIG 273*     Recent Labs   Lab Test 05/30/23  1440 08/04/15  1622    121     Recent Labs   Lab Test 07/24/23  1449      POTASSIUM 4.7   CHLORIDE 102   CO2 27      BUN 27*   CR 1.60*   GFRESTIMATED 50*   TJ 10.7*     Recent Labs   Lab Test 07/24/23  1449 06/06/23  1143 05/30/23  1440   CR 1.60* 1.18* 1.33*     No results for input(s): A1C in the last 83699 hours.       Recent Labs   Lab Test 05/30/23  1440   WBC 6.0   HGB 11.7*   HCT 34.8*   MCV 89        Recent Labs   Lab Test 05/30/23  1440 04/26/21  1134 06/02/20  1511   HGB 11.7* 13.2 12.5*    No results for input(s): TROPONINI in the last 33228 hours.  Recent Labs   Lab Test 06/01/23  1217   NTBNP 1,230*     Recent Labs   Lab Test 05/30/23  1440   TSH 1.30     No results for input(s): INR in the last 52441 hours.     Sakshi Hernandez MD

## 2023-09-08 DIAGNOSIS — M1A.9XX0 CHRONIC GOUT INVOLVING TOE OF LEFT FOOT WITHOUT TOPHUS, UNSPECIFIED CAUSE: ICD-10-CM

## 2023-09-08 LAB — NT-PROBNP SERPL-MCNC: 332 PG/ML (ref 0–900)

## 2023-09-08 RX ORDER — ALLOPURINOL 100 MG/1
200 TABLET ORAL DAILY
Qty: 90 TABLET | Refills: 3 | Status: SHIPPED | OUTPATIENT
Start: 2023-09-08 | End: 2024-03-19

## 2023-09-13 DIAGNOSIS — R06.09 DYSPNEA ON EXERTION: ICD-10-CM

## 2023-09-13 DIAGNOSIS — I50.20 HEART FAILURE WITH REDUCED EJECTION FRACTION, NYHA CLASS IV (H): Primary | ICD-10-CM

## 2023-09-25 ENCOUNTER — MYC REFILL (OUTPATIENT)
Dept: FAMILY MEDICINE | Facility: CLINIC | Age: 58
End: 2023-09-25
Payer: COMMERCIAL

## 2023-09-25 DIAGNOSIS — I50.21 ACUTE SYSTOLIC CONGESTIVE HEART FAILURE (H): ICD-10-CM

## 2023-09-25 DIAGNOSIS — R06.02 SOB (SHORTNESS OF BREATH): ICD-10-CM

## 2023-09-25 RX ORDER — FUROSEMIDE 20 MG
20 TABLET ORAL DAILY
Qty: 30 TABLET | Refills: 1 | Status: SHIPPED | OUTPATIENT
Start: 2023-09-25 | End: 2023-11-22

## 2023-09-25 NOTE — TELEPHONE ENCOUNTER
Routing refill request to provider for review/approval because:  Drug fails the [facility] refill protocol       Last Written Prescription Date: 8/29/22  Last Fill Quantity: 30,  # refills: 0   Last office visit: 7/17/2023

## 2023-10-12 ENCOUNTER — HOSPITAL ENCOUNTER (OUTPATIENT)
Dept: CT IMAGING | Facility: CLINIC | Age: 58
Discharge: HOME | End: 2023-10-12
Attending: INTERNAL MEDICINE | Admitting: INTERNAL MEDICINE
Payer: COMMERCIAL

## 2023-10-12 VITALS
WEIGHT: 290 LBS | SYSTOLIC BLOOD PRESSURE: 135 MMHG | DIASTOLIC BLOOD PRESSURE: 70 MMHG | HEIGHT: 71 IN | BODY MASS INDEX: 40.6 KG/M2

## 2023-10-12 DIAGNOSIS — I50.20 HEART FAILURE WITH REDUCED EJECTION FRACTION, NYHA CLASS IV (H): ICD-10-CM

## 2023-10-12 DIAGNOSIS — R06.09 DYSPNEA ON EXERTION: ICD-10-CM

## 2023-10-12 LAB
BSA FOR ECHO PROCEDURE: 2.47 M2
CCTA ASCENDING AORTA: 2.7
CCTA SINUS: 3.4
CREAT BLD-MCNC: 1.6 MG/DL (ref 0.7–1.3)
CV CALCIUM SCORE AGATSTON LM: 0
CV CALCIUM SCORING AGATSON LAD: 72
CV CALCIUM SCORING AGATSTON CX: 3
CV CALCIUM SCORING AGATSTON RCA: 100
CV CALCIUM SCORING AGATSTON TOTAL: 175
EGFRCR SERPLBLD CKD-EPI 2021: 50 ML/MIN/1.73M2

## 2023-10-12 PROCEDURE — 75574 CT ANGIO HRT W/3D IMAGE: CPT

## 2023-10-12 PROCEDURE — 250N000009 HC RX 250: Performed by: INTERNAL MEDICINE

## 2023-10-12 PROCEDURE — 82565 ASSAY OF CREATININE: CPT

## 2023-10-12 PROCEDURE — 250N000013 HC RX MED GY IP 250 OP 250 PS 637: Performed by: INTERNAL MEDICINE

## 2023-10-12 PROCEDURE — 250N000011 HC RX IP 250 OP 636: Mod: JZ | Performed by: INTERNAL MEDICINE

## 2023-10-12 PROCEDURE — 75574 CT ANGIO HRT W/3D IMAGE: CPT | Mod: 26 | Performed by: GENERAL ACUTE CARE HOSPITAL

## 2023-10-12 RX ORDER — NITROGLYCERIN 0.4 MG/1
0.4 TABLET SUBLINGUAL ONCE
Status: COMPLETED | OUTPATIENT
Start: 2023-10-12 | End: 2023-10-12

## 2023-10-12 RX ORDER — IOPAMIDOL 755 MG/ML
100 INJECTION, SOLUTION INTRAVASCULAR ONCE
Status: COMPLETED | OUTPATIENT
Start: 2023-10-12 | End: 2023-10-12

## 2023-10-12 RX ORDER — DILTIAZEM HYDROCHLORIDE 5 MG/ML
10 INJECTION INTRAVENOUS
Status: DISCONTINUED | OUTPATIENT
Start: 2023-10-12 | End: 2023-10-13 | Stop reason: HOSPADM

## 2023-10-12 RX ORDER — METOPROLOL TARTRATE 1 MG/ML
5 INJECTION, SOLUTION INTRAVENOUS
Status: DISCONTINUED | OUTPATIENT
Start: 2023-10-12 | End: 2023-10-13 | Stop reason: HOSPADM

## 2023-10-12 RX ORDER — LIDOCAINE 40 MG/G
CREAM TOPICAL
Status: DISCONTINUED | OUTPATIENT
Start: 2023-10-12 | End: 2023-10-13 | Stop reason: HOSPADM

## 2023-10-12 RX ORDER — DILTIAZEM HYDROCHLORIDE 5 MG/ML
5 INJECTION INTRAVENOUS
Status: DISCONTINUED | OUTPATIENT
Start: 2023-10-12 | End: 2023-10-13 | Stop reason: HOSPADM

## 2023-10-12 RX ADMIN — METOPROLOL TARTRATE 5 MG: 5 INJECTION INTRAVENOUS at 13:55

## 2023-10-12 RX ADMIN — METOPROLOL TARTRATE 5 MG: 5 INJECTION INTRAVENOUS at 13:45

## 2023-10-12 RX ADMIN — METOPROLOL TARTRATE 5 MG: 5 INJECTION INTRAVENOUS at 13:49

## 2023-10-12 RX ADMIN — NITROGLYCERIN 0.4 MG: 0.4 TABLET SUBLINGUAL at 13:56

## 2023-10-12 RX ADMIN — IOPAMIDOL 100 ML: 755 INJECTION, SOLUTION INTRAVENOUS at 13:57

## 2023-10-17 DIAGNOSIS — E78.5 DYSLIPIDEMIA: Primary | ICD-10-CM

## 2023-10-17 DIAGNOSIS — I25.10 CAD (CORONARY ARTERY DISEASE): ICD-10-CM

## 2023-10-17 RX ORDER — ROSUVASTATIN CALCIUM 10 MG/1
10 TABLET, COATED ORAL DAILY
Qty: 90 TABLET | Refills: 1 | Status: SHIPPED | OUTPATIENT
Start: 2023-10-17 | End: 2024-03-20

## 2023-10-30 ENCOUNTER — MYC MEDICAL ADVICE (OUTPATIENT)
Dept: FAMILY MEDICINE | Facility: CLINIC | Age: 58
End: 2023-10-30
Payer: COMMERCIAL

## 2023-10-30 DIAGNOSIS — M1A.9XX0 CHRONIC GOUT INVOLVING TOE OF LEFT FOOT WITHOUT TOPHUS, UNSPECIFIED CAUSE: Primary | ICD-10-CM

## 2023-10-30 RX ORDER — PREDNISONE 10 MG/1
TABLET ORAL
Qty: 42 TABLET | Refills: 0 | Status: SHIPPED | OUTPATIENT
Start: 2023-10-30 | End: 2023-11-19

## 2023-11-22 DIAGNOSIS — I50.21 ACUTE SYSTOLIC CONGESTIVE HEART FAILURE (H): ICD-10-CM

## 2023-11-22 DIAGNOSIS — R06.02 SOB (SHORTNESS OF BREATH): ICD-10-CM

## 2023-11-22 RX ORDER — FUROSEMIDE 20 MG
20 TABLET ORAL DAILY
Qty: 30 TABLET | Refills: 1 | Status: SHIPPED | OUTPATIENT
Start: 2023-11-22 | End: 2024-01-16

## 2023-12-01 ENCOUNTER — OFFICE VISIT (OUTPATIENT)
Dept: FAMILY MEDICINE | Facility: CLINIC | Age: 58
End: 2023-12-01
Payer: COMMERCIAL

## 2023-12-01 VITALS
OXYGEN SATURATION: 95 % | SYSTOLIC BLOOD PRESSURE: 120 MMHG | RESPIRATION RATE: 16 BRPM | DIASTOLIC BLOOD PRESSURE: 72 MMHG | TEMPERATURE: 98 F | HEIGHT: 72 IN | BODY MASS INDEX: 39.68 KG/M2 | WEIGHT: 293 LBS | HEART RATE: 76 BPM

## 2023-12-01 DIAGNOSIS — F32.1 MODERATE MAJOR DEPRESSION (H): ICD-10-CM

## 2023-12-01 DIAGNOSIS — M1A.9XX0 CHRONIC GOUT INVOLVING TOE OF LEFT FOOT WITHOUT TOPHUS, UNSPECIFIED CAUSE: ICD-10-CM

## 2023-12-01 DIAGNOSIS — E66.01 MORBID OBESITY (H): ICD-10-CM

## 2023-12-01 DIAGNOSIS — Z01.818 PREOP GENERAL PHYSICAL EXAM: ICD-10-CM

## 2023-12-01 DIAGNOSIS — Z86.718 HISTORY OF DEEP VENOUS THROMBOSIS: ICD-10-CM

## 2023-12-01 DIAGNOSIS — I10 PRIMARY HYPERTENSION: ICD-10-CM

## 2023-12-01 DIAGNOSIS — I50.32 CHRONIC HEART FAILURE WITH PRESERVED EJECTION FRACTION (H): ICD-10-CM

## 2023-12-01 DIAGNOSIS — G47.33 OBSTRUCTIVE SLEEP APNEA SYNDROME: Primary | ICD-10-CM

## 2023-12-01 DIAGNOSIS — M79.89 MASS OF SOFT TISSUE OF FOOT: ICD-10-CM

## 2023-12-01 LAB
ERYTHROCYTE [DISTWIDTH] IN BLOOD BY AUTOMATED COUNT: 13.1 % (ref 10–15)
HCT VFR BLD AUTO: 37.6 % (ref 40–53)
HGB BLD-MCNC: 12.4 G/DL (ref 13.3–17.7)
MCH RBC QN AUTO: 29.7 PG (ref 26.5–33)
MCHC RBC AUTO-ENTMCNC: 33 G/DL (ref 31.5–36.5)
MCV RBC AUTO: 90 FL (ref 78–100)
PLATELET # BLD AUTO: 231 10E3/UL (ref 150–450)
RBC # BLD AUTO: 4.17 10E6/UL (ref 4.4–5.9)
WBC # BLD AUTO: 6.6 10E3/UL (ref 4–11)

## 2023-12-01 PROCEDURE — 80048 BASIC METABOLIC PNL TOTAL CA: CPT | Performed by: FAMILY MEDICINE

## 2023-12-01 PROCEDURE — 93000 ELECTROCARDIOGRAM COMPLETE: CPT | Performed by: FAMILY MEDICINE

## 2023-12-01 PROCEDURE — 36415 COLL VENOUS BLD VENIPUNCTURE: CPT | Performed by: FAMILY MEDICINE

## 2023-12-01 PROCEDURE — 85027 COMPLETE CBC AUTOMATED: CPT | Performed by: FAMILY MEDICINE

## 2023-12-01 PROCEDURE — 99214 OFFICE O/P EST MOD 30 MIN: CPT | Mod: 25 | Performed by: FAMILY MEDICINE

## 2023-12-01 ASSESSMENT — PATIENT HEALTH QUESTIONNAIRE - PHQ9
10. IF YOU CHECKED OFF ANY PROBLEMS, HOW DIFFICULT HAVE THESE PROBLEMS MADE IT FOR YOU TO DO YOUR WORK, TAKE CARE OF THINGS AT HOME, OR GET ALONG WITH OTHER PEOPLE: NOT DIFFICULT AT ALL
SUM OF ALL RESPONSES TO PHQ QUESTIONS 1-9: 0
SUM OF ALL RESPONSES TO PHQ QUESTIONS 1-9: 0

## 2023-12-01 ASSESSMENT — PAIN SCALES - GENERAL: PAINLEVEL: NO PAIN (0)

## 2023-12-01 NOTE — PROGRESS NOTES
[address] [address]  Phone: [phone]  Fax: [phone]  Primary Provider: William Sweeney  Pre-op Performing Provider: WILLIAM SWEENEY      PREOPERATIVE EVALUATION:  Today's date: 12/1/2023    Christ is a 57 year old, presenting for the following:  Pre-Op Exam (DOS: 12/20/2023 for EXCISION SOFT TISSUE MASS, EXCISION CYST METATARSAL, FIRST METATARSAL PHALANGEAL JOINT FUSION, LEFT FOOT with Dr Maynard @ [facility] [place])        12/1/2023     3:12 PM   Additional Questions   Roomed by srud   Accompanied by n/a       Surgical Information:  Surgery/Procedure: EXCISION SOFT TISSUE MASS, EXCISION CYST METATARSAL, FIRST METATARSAL PHALANGEAL JOINT FUSION, LEFT FOOT   Surgery Location: [facility]  Surgeon: Caesar  Surgery Date: 12/20/2023  Time of Surgery: TBD  Where patient plans to recover: At home with family  Fax number for surgical facility: Note does not need to be faxed, will be available electronically in Epic.    Assessment & Plan     The proposed surgical procedure is considered INTERMEDIATE risk.      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    Additional Medication Instructions:  Patient is to take all scheduled medications on the day of surgery    RECOMMENDATION:  APPROVAL GIVEN to proceed with proposed procedure pending review of diagnostic evaluation.            Subjective       HPI related to upcoming procedure:  Cyst over left MTP joint, with recurrent gout.        12/1/2023     3:07 PM   Preop Questions   1. Have you ever had a heart attack or stroke? No   2. Have you ever had surgery on your heart or blood vessels, such as a stent placement, a coronary artery bypass, or surgery on an artery in your head, neck, heart, or legs? No   3. Do you have chest pain with activity? No   4. Do you have a history of  heart failure? YES - Treated   5. Do you currently have a cold, bronchitis or symptoms of other infection? No   6. Do you have a cough, shortness of breath, or wheezing? No   7. Do you or anyone in your family have previous history of blood clots? No   8. Do you or does anyone in your family have a serious bleeding problem such as prolonged bleeding following surgeries or cuts? No   9. Have you ever had problems with anemia or been told to take iron pills? No   10. Have you had any abnormal blood loss such as black, tarry or bloody stools? No   11. Have you ever had a blood transfusion? No   12. Are you willing to have a blood transfusion if it is medically needed before, during, or after your surgery? Yes   13. Have you or any of your relatives ever had problems with anesthesia? No   14. Do you have sleep apnea, excessive snoring or daytime drowsiness? YES - CPAP   14a. Do you have a CPAP machine? Yes   15. Do you have any artifical heart valves or other implanted medical devices like a pacemaker, defibrillator, or continuous glucose monitor? No   16. Do you have artificial joints? No   17. Are you allergic to latex? No       Health Care Directive:  Patient does not have a Health Care Directive or Living Will: Patient states has Advance Directive and will bring in a copy to clinic.    Preoperative Review of :   reviewed - no record of controlled substances prescribed.      Status of Chronic Conditions:  CHF - Patient has a longstanding history of moderate-severe CHF. Exacerbating conditions include hypertension.  Currently the patient's condition is stable. Current treatment regimen includes ACE inhibitor, beta blocker, and diuretic. The patient denies chest pain, edema, orthopnea, SOB or recent weight gain. Last Echocardiogram     ECHOCARDIOGRAM: 6/22/2023  Interpretation Summary  Left ventricular size, wall motion and function are normal. The ejection  fraction is 55-60%.  Global right ventricular function is normal.  Moderate biatrial enlargement.  There appears to be prosthetic material in the atrial septum, likely ASD  repair.  There was no shunt at the atrial septal level as assessed by agitated saline  bubble study at rest and with Valsalva maneuver.  No hemodynamically significant valve abnormalities.  The inferior vena cava is normal., EKG 6/2023  nonspecific rhythm.     HYPERTENSION - Patient has longstanding history of HTN , currently denies any symptoms referable to elevated blood pressure. Specifically denies chest pain, palpitations, dyspnea, orthopnea, PND or peripheral edema. Blood pressure readings have been in normal range. Current medication regimen is as listed below. Patient denies any side effects of medication.     SLEEP PROBLEM - Patient has a longstanding history of snoring, excessive daytime somnolence, and fatigue.  Patient uses CPAP     Review of Systems  CONSTITUTIONAL: NEGATIVE for fever, chills, change in weight  INTEGUMENTARY/SKIN: NEGATIVE for worrisome rashes, moles or lesions  EYES: NEGATIVE for vision changes or irritation  ENT/MOUTH: NEGATIVE for ear, mouth and throat problems  RESP: NEGATIVE for significant cough or SOB  CV: NEGATIVE for chest pain, palpitations or peripheral edema  GI: NEGATIVE for nausea, abdominal pain, heartburn, or change in bowel habits  : NEGATIVE for frequency, dysuria, or hematuria  MUSCULOSKELETAL:left foot  first MTP inflamation  NEURO: NEGATIVE for weakness, dizziness or paresthesias  ENDOCRINE: NEGATIVE for temperature intolerance, skin/hair changes  HEME:  NEGATIVE for bleeding problems  PSYCHIATRIC: NEGATIVE for changes in mood or affect    Patient Active Problem List    Diagnosis Date Noted    Chronic heart failure with preserved ejection fraction (H) 07/24/2023     Priority: Medium    Biventricular congestive heart failure (H) 06/23/2023     Priority: Medium    Moderate major depression (H) 03/30/2022     Priority: Medium    Gout 03/30/2022     Priority: Medium    History of deep venous thrombosis 03/30/2022     Priority: Medium    Hypertension 03/30/2022     Priority: Medium    Obstructive sleep apnea syndrome 03/30/2022     Priority: Medium    Morbid obesity (H)      Priority: Medium      Past Medical History:   Diagnosis Date    Congestive heart failure (H)     Hypertension     Obese     Sleep apnea      Past Surgical History:   Procedure Laterality Date    REPAIR ATRIAL SEPTAL DEFECT       Current Outpatient Medications   Medication Sig Dispense Refill    allopurinol (ZYLOPRIM) 100 MG tablet Take 2 tablets (200 mg) by mouth daily 90 tablet 3    carvedilol (COREG) 6.25 MG tablet Take 1 tablet (6.25 mg) by mouth 2 times daily (with meals) 60 tablet 4    colchicine (COLCRYS) 0.6 MG tablet Take 1 tablet (0.6 mg) by mouth daily 30 tablet 1    diphenhydrAMINE (BENADRYL ALLERGY) 25 MG capsule Take 25 mg by mouth every 6 hours as needed      FLUoxetine (PROZAC) 20 MG capsule Take 1 capsule (20 mg) by mouth daily 90 capsule 3    furosemide (LASIX) 20 MG tablet TAKE 1 TABLET(20 MG) BY MOUTH DAILY 30 tablet 1    lisinopril (ZESTRIL) 5 MG tablet Take 1 tablet (5 mg) by mouth daily 90 tablet 3    rosuvastatin (CRESTOR) 10 MG tablet Take 1 tablet (10 mg) by mouth daily 90 tablet 1       Allergies   Allergen Reactions    Fexofenadine-Pseudoephed Er      Other reaction(s): Chest Pain  High BP      Penicillins      Other reaction(s): *Unknown - Childhood Rxn, unknown        Social History     Tobacco Use    Smoking status: Never     Passive exposure: Never    Smokeless tobacco:  "Never   Substance Use Topics    Alcohol use: Not on file     No family history on file.  History   Drug Use Not on file         Objective     /72 (BP Location: Right arm, Patient Position: Sitting)   Pulse 76   Temp 98  F (36.7  C) (Tympanic)   Resp 16   Ht 1.816 m (5' 11.5\")   Wt 132.9 kg (293 lb)   SpO2 95%   BMI 40.30 kg/m      Physical Exam    GENERAL APPEARANCE: healthy, alert and no distress     EYES: EOMI,  PERRL     HENT: ear canals and TM's normal and nose and mouth without ulcers or lesions     NECK: no adenopathy, no asymmetry, masses, or scars and thyroid normal to palpation     RESP: lungs clear to auscultation - no rales, rhonchi or wheezes     CV: regular rates and rhythm, normal S1 S2, no S3 or S4 and no murmur, click or rub     ABDOMEN:  soft, nontender, no HSM or masses and bowel sounds normal     MS: tender to palpation left first MTP joint     SKIN: no suspicious lesions or rashes     NEURO: Normal strength and tone, sensory exam grossly normal, mentation intact and speech normal     PSYCH: mentation appears normal. and affect normal/bright     LYMPHATICS: No cervical adenopathy    Recent Labs   Lab Test 10/12/23  1343 09/07/23  1528 07/24/23  1449 06/06/23  1143 05/30/23  1440   HGB  --   --   --   --  11.7*   PLT  --   --   --   --  206   NA  --  142 142   < > 144   POTASSIUM  --  4.6 4.7   < > 4.0   CR 1.6* 1.23* 1.60*   < > 1.33*    < > = values in this interval not displayed.        Diagnostics:  Labs pending at this time.  Results will be reviewed when available.   EKG: Borderline rhythm, normal axis, normal intervals, no acute ST/T changes c/w ischemia, no LVH by voltage criteria    Revised Cardiac Risk Index (RCRI):  The patient has the following serious cardiovascular risks for perioperative complications:   - Congestive Heart Failure (pulmonary edema, PND, s3 vicente, CXR with pulmonary congestion, basilar rales) = 1 point     RCRI Interpretation: 1 point: Class II (low risk " - 0.9% complication rate)         Signed Electronically by: Lui Sweeney MD  Copy of this evaluation report is provided to requesting physician.      Answers submitted by the patient for this visit:  Patient Health Questionnaire (Submitted on 12/1/2023)  If you checked off any problems, how difficult have these problems made it for you to do your work, take care of things at home, or get along with other people?: Not difficult at all  PHQ9 TOTAL SCORE: 0

## 2023-12-02 LAB
ANION GAP SERPL CALCULATED.3IONS-SCNC: 13 MMOL/L (ref 7–15)
BUN SERPL-MCNC: 25.7 MG/DL (ref 6–20)
CALCIUM SERPL-MCNC: 9.9 MG/DL (ref 8.6–10)
CHLORIDE SERPL-SCNC: 103 MMOL/L (ref 98–107)
CREAT SERPL-MCNC: 1.11 MG/DL (ref 0.67–1.17)
DEPRECATED HCO3 PLAS-SCNC: 23 MMOL/L (ref 22–29)
EGFRCR SERPLBLD CKD-EPI 2021: 77 ML/MIN/1.73M2
GLUCOSE SERPL-MCNC: 83 MG/DL (ref 70–99)
POTASSIUM SERPL-SCNC: 4.5 MMOL/L (ref 3.4–5.3)
SODIUM SERPL-SCNC: 139 MMOL/L (ref 135–145)

## 2023-12-15 ENCOUNTER — TELEPHONE (OUTPATIENT)
Dept: FAMILY MEDICINE | Facility: CLINIC | Age: 58
End: 2023-12-15
Payer: COMMERCIAL

## 2023-12-15 NOTE — TELEPHONE ENCOUNTER
12-15-23      Provider Communication    Who is calling:  sarah martinez DPM @ [facility]    Facility in which provider is associated:  [facility]     Reason for call:  pt is scheduled for surgery 12-20-23 soft tissue removal     Okay to leave detailed message?:  Yes at Other phone number:  [phone]

## 2023-12-15 NOTE — TELEPHONE ENCOUNTER
Left a message to check and see if patient has had a pre-op done for upcoming procedure on 12/20/2023. If not, I did let him know that that will be needed before the procedure can be done.

## 2023-12-22 ENCOUNTER — MYC MEDICAL ADVICE (OUTPATIENT)
Dept: FAMILY MEDICINE | Facility: CLINIC | Age: 58
End: 2023-12-22
Payer: COMMERCIAL

## 2023-12-22 DIAGNOSIS — M10.9 ACUTE GOUT INVOLVING TOE, UNSPECIFIED CAUSE, UNSPECIFIED LATERALITY: Primary | ICD-10-CM

## 2023-12-22 RX ORDER — PREDNISONE 10 MG/1
40 TABLET ORAL DAILY
Qty: 20 TABLET | Refills: 0 | Status: SHIPPED | OUTPATIENT
Start: 2023-12-22 | End: 2023-12-27

## 2023-12-22 NOTE — TELEPHONE ENCOUNTER
Routing request to provider. Pt last had prednisone 10 mg prescribed 10/30 for chronic gout.  Requesting another prescription for gout attack. Pended for approval.

## 2024-01-16 DIAGNOSIS — R06.02 SOB (SHORTNESS OF BREATH): ICD-10-CM

## 2024-01-16 DIAGNOSIS — I50.21 ACUTE SYSTOLIC CONGESTIVE HEART FAILURE (H): ICD-10-CM

## 2024-01-16 RX ORDER — FUROSEMIDE 20 MG
20 TABLET ORAL DAILY
Qty: 90 TABLET | Refills: 2 | Status: SHIPPED | OUTPATIENT
Start: 2024-01-16 | End: 2024-07-09

## 2024-01-17 ENCOUNTER — MYC MEDICAL ADVICE (OUTPATIENT)
Dept: FAMILY MEDICINE | Facility: CLINIC | Age: 59
End: 2024-01-17
Payer: COMMERCIAL

## 2024-01-17 DIAGNOSIS — M10.9 GOUT: Primary | ICD-10-CM

## 2024-01-17 DIAGNOSIS — M10.9 ACUTE GOUT INVOLVING TOE, UNSPECIFIED CAUSE, UNSPECIFIED LATERALITY: ICD-10-CM

## 2024-01-17 RX ORDER — PREDNISONE 10 MG/1
10 TABLET ORAL 2 TIMES DAILY
Qty: 14 TABLET | Refills: 0 | Status: SHIPPED | OUTPATIENT
Start: 2024-01-17

## 2024-03-04 ENCOUNTER — MYC MEDICAL ADVICE (OUTPATIENT)
Dept: FAMILY MEDICINE | Facility: CLINIC | Age: 59
End: 2024-03-04
Payer: COMMERCIAL

## 2024-03-04 ENCOUNTER — MYC REFILL (OUTPATIENT)
Dept: FAMILY MEDICINE | Facility: CLINIC | Age: 59
End: 2024-03-04
Payer: COMMERCIAL

## 2024-03-04 DIAGNOSIS — I50.21 ACUTE SYSTOLIC CONGESTIVE HEART FAILURE (H): ICD-10-CM

## 2024-03-04 RX ORDER — CARVEDILOL 6.25 MG/1
6.25 TABLET ORAL 2 TIMES DAILY WITH MEALS
Qty: 60 TABLET | Refills: 4 | OUTPATIENT
Start: 2024-03-04

## 2024-03-04 RX ORDER — CARVEDILOL 6.25 MG/1
6.25 TABLET ORAL 2 TIMES DAILY WITH MEALS
Qty: 60 TABLET | Refills: 2 | Status: SHIPPED | OUTPATIENT
Start: 2024-03-04 | End: 2024-06-03

## 2024-03-19 DIAGNOSIS — M1A.9XX0 CHRONIC GOUT INVOLVING TOE OF LEFT FOOT WITHOUT TOPHUS, UNSPECIFIED CAUSE: ICD-10-CM

## 2024-03-19 RX ORDER — ALLOPURINOL 100 MG/1
200 TABLET ORAL DAILY
Qty: 90 TABLET | Refills: 3 | Status: SHIPPED | OUTPATIENT
Start: 2024-03-19

## 2024-03-20 DIAGNOSIS — E78.5 DYSLIPIDEMIA: ICD-10-CM

## 2024-03-20 DIAGNOSIS — I25.10 CAD (CORONARY ARTERY DISEASE): ICD-10-CM

## 2024-03-20 RX ORDER — ROSUVASTATIN CALCIUM 10 MG/1
10 TABLET, COATED ORAL DAILY
Qty: 90 TABLET | Refills: 0 | Status: SHIPPED | OUTPATIENT
Start: 2024-03-20

## 2024-04-23 ENCOUNTER — MYC REFILL (OUTPATIENT)
Dept: CARDIOLOGY | Facility: CLINIC | Age: 59
End: 2024-04-23
Payer: COMMERCIAL

## 2024-04-23 DIAGNOSIS — I50.21 ACUTE SYSTOLIC CONGESTIVE HEART FAILURE (H): ICD-10-CM

## 2024-04-23 RX ORDER — LISINOPRIL 5 MG/1
5 TABLET ORAL DAILY
Qty: 90 TABLET | Refills: 0 | Status: SHIPPED | OUTPATIENT
Start: 2024-04-23 | End: 2024-06-18

## 2024-05-29 DIAGNOSIS — F33.1 MODERATE RECURRENT MAJOR DEPRESSION (H): ICD-10-CM

## 2024-06-03 DIAGNOSIS — I50.21 ACUTE SYSTOLIC CONGESTIVE HEART FAILURE (H): ICD-10-CM

## 2024-06-03 RX ORDER — CARVEDILOL 6.25 MG/1
6.25 TABLET ORAL 2 TIMES DAILY WITH MEALS
Qty: 60 TABLET | Refills: 2 | Status: SHIPPED | OUTPATIENT
Start: 2024-06-03 | End: 2024-08-07

## 2024-06-17 DIAGNOSIS — I50.21 ACUTE SYSTOLIC CONGESTIVE HEART FAILURE (H): ICD-10-CM

## 2024-06-18 RX ORDER — LISINOPRIL 5 MG/1
5 TABLET ORAL DAILY
Qty: 90 TABLET | Refills: 0 | Status: SHIPPED | OUTPATIENT
Start: 2024-06-18

## 2024-07-09 ENCOUNTER — MYC REFILL (OUTPATIENT)
Dept: FAMILY MEDICINE | Facility: CLINIC | Age: 59
End: 2024-07-09
Payer: COMMERCIAL

## 2024-07-09 DIAGNOSIS — I50.21 ACUTE SYSTOLIC CONGESTIVE HEART FAILURE (H): ICD-10-CM

## 2024-07-09 DIAGNOSIS — R06.02 SOB (SHORTNESS OF BREATH): ICD-10-CM

## 2024-07-09 RX ORDER — FUROSEMIDE 20 MG
20 TABLET ORAL DAILY
Qty: 90 TABLET | Refills: 1 | Status: SHIPPED | OUTPATIENT
Start: 2024-07-09

## 2024-07-09 NOTE — TELEPHONE ENCOUNTER
Prescription approved per [facility] Refill Protocol.    Last Written Prescription Date:  1/16/24  Last Fill Quantity: 90,  # refills: 2   Last office visit: 12/1/2023

## 2024-07-27 ENCOUNTER — HEALTH MAINTENANCE LETTER (OUTPATIENT)
Age: 59
End: 2024-07-27

## 2024-08-07 DIAGNOSIS — I50.21 ACUTE SYSTOLIC CONGESTIVE HEART FAILURE (H): ICD-10-CM

## 2024-08-07 RX ORDER — CARVEDILOL 6.25 MG/1
6.25 TABLET ORAL 2 TIMES DAILY WITH MEALS
Qty: 60 TABLET | Refills: 2 | Status: SHIPPED | OUTPATIENT
Start: 2024-08-07

## 2024-08-29 DIAGNOSIS — M1A.9XX0 CHRONIC GOUT INVOLVING TOE OF LEFT FOOT WITHOUT TOPHUS, UNSPECIFIED CAUSE: ICD-10-CM

## 2024-08-29 RX ORDER — ALLOPURINOL 100 MG/1
200 TABLET ORAL DAILY
Qty: 90 TABLET | Refills: 3 | OUTPATIENT
Start: 2024-08-29

## 2024-10-08 ENCOUNTER — MYC REFILL (OUTPATIENT)
Dept: FAMILY MEDICINE | Facility: CLINIC | Age: 59
End: 2024-10-08
Payer: COMMERCIAL

## 2024-10-08 DIAGNOSIS — M1A.9XX0 CHRONIC GOUT INVOLVING TOE OF LEFT FOOT WITHOUT TOPHUS, UNSPECIFIED CAUSE: ICD-10-CM

## 2024-10-08 RX ORDER — ALLOPURINOL 100 MG/1
200 TABLET ORAL DAILY
Qty: 90 TABLET | Refills: 3 | Status: SHIPPED | OUTPATIENT
Start: 2024-10-08

## 2024-12-04 ENCOUNTER — OFFICE VISIT (OUTPATIENT)
Dept: FAMILY MEDICINE | Facility: CLINIC | Age: 59
End: 2024-12-04
Payer: COMMERCIAL

## 2024-12-04 VITALS
TEMPERATURE: 97.9 F | OXYGEN SATURATION: 97 % | HEART RATE: 78 BPM | RESPIRATION RATE: 16 BRPM | SYSTOLIC BLOOD PRESSURE: 124 MMHG | BODY MASS INDEX: 40.2 KG/M2 | HEIGHT: 72 IN | DIASTOLIC BLOOD PRESSURE: 76 MMHG | WEIGHT: 296.8 LBS

## 2024-12-04 DIAGNOSIS — R73.02 IGT (IMPAIRED GLUCOSE TOLERANCE): ICD-10-CM

## 2024-12-04 DIAGNOSIS — E66.813 CLASS 3 DRUG-INDUCED OBESITY WITH SERIOUS COMORBIDITY AND BODY MASS INDEX (BMI) OF 40.0 TO 44.9 IN ADULT (H): ICD-10-CM

## 2024-12-04 DIAGNOSIS — Z12.11 SCREEN FOR COLON CANCER: ICD-10-CM

## 2024-12-04 DIAGNOSIS — I10 ESSENTIAL HYPERTENSION: ICD-10-CM

## 2024-12-04 DIAGNOSIS — E66.1 CLASS 3 DRUG-INDUCED OBESITY WITH SERIOUS COMORBIDITY AND BODY MASS INDEX (BMI) OF 40.0 TO 44.9 IN ADULT (H): ICD-10-CM

## 2024-12-04 DIAGNOSIS — I50.82 BIVENTRICULAR CONGESTIVE HEART FAILURE (H): Primary | ICD-10-CM

## 2024-12-04 PROCEDURE — 99214 OFFICE O/P EST MOD 30 MIN: CPT | Performed by: PHYSICIAN ASSISTANT

## 2024-12-04 PROCEDURE — G2211 COMPLEX E/M VISIT ADD ON: HCPCS | Performed by: PHYSICIAN ASSISTANT

## 2024-12-04 RX ORDER — LISINOPRIL 20 MG/1
20 TABLET ORAL DAILY
COMMUNITY
Start: 2024-03-14 | End: 2024-12-04

## 2024-12-04 RX ORDER — TIRZEPATIDE 2.5 MG/.5ML
2.5 INJECTION, SOLUTION SUBCUTANEOUS
Qty: 2 ML | Refills: 1 | Status: SHIPPED | OUTPATIENT
Start: 2024-12-04

## 2024-12-04 ASSESSMENT — PATIENT HEALTH QUESTIONNAIRE - PHQ9
10. IF YOU CHECKED OFF ANY PROBLEMS, HOW DIFFICULT HAVE THESE PROBLEMS MADE IT FOR YOU TO DO YOUR WORK, TAKE CARE OF THINGS AT HOME, OR GET ALONG WITH OTHER PEOPLE: NOT DIFFICULT AT ALL
SUM OF ALL RESPONSES TO PHQ QUESTIONS 1-9: 1
SUM OF ALL RESPONSES TO PHQ QUESTIONS 1-9: 1

## 2024-12-04 NOTE — PROGRESS NOTES
"  Assessment & Plan     (I50.82) Biventricular congestive heart failure (H)  (primary encounter diagnosis)  Comment: Stable  Plan: CANCELED: Lipid panel reflex to direct LDL         Non-fasting, CANCELED: CBC with Platelets        Continue current treatment plan    (Z12.11) Screen for colon cancer  Comment: Average risk  Plan: Fecal colorectal cancer screen FIT - Future         (S+30)        Do FIT testing    (I10) Essential hypertension  Comment: Controlled  Plan: Continue current medication    (R73.02) IGT (impaired glucose tolerance)  Comment: Bring in lab work from work  Plan: ZEPBOUND 2.5 MG/0.5ML prefilled pen        Will start Zepbound dosing discussed side effects discussed    (E66.813,  E66.1,  Z68.41) Class 3 drug-induced obesity with serious comorbidity and body mass index (BMI) of 40.0 to 44.9 in adult (H)  Comment: See above  Plan: ZEPBOUND 2.5 MG/0.5ML prefilled pen, CANCELED:         BASIC METABOLIC PANEL        See above          BMI  Estimated body mass index is 40.82 kg/m  as calculated from the following:    Height as of this encounter: 1.816 m (5' 11.5\").    Weight as of this encounter: 134.6 kg (296 lb 12.8 oz).             Javier Polo is a 58 year old, presenting for the following health issues:  Medication Therapy Management        12/4/2024     3:19 PM   Additional Questions   Roomed by MIRA Decker     Patient is here to discuss his weight.  His BMI is now over 40  We had prescribed Wegovy sometime back but then due to the shortage she never got it filled  He did not recall receiving a rejection from his insurance company not recall seeing that either.  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  He states this lends itself to him consuming more alcohol than he would like    History of Present Illness       Heart Failure:  He presents for follow up of heart failure. He is not experiencing shortness of breath at night, with rest or with activity  He is experiencing lower extremity edema which is same as usual.   He denies orthopenea and is not coughing at night. Patient is checking weight daily. He has recently had a None.  He has no side effects from medications.  He has has a medical visit for heart failure 1 time since the last visit.    He eats 2-3 servings of fruits and vegetables daily.He consumes 0 sweetened beverage(s) daily.He exercises with enough effort to increase his heart rate 9 or less minutes per day.  He exercises with enough effort to increase his heart rate 3 or less days per week.   He is taking medications regularly.     Last Echo:   Echo result w/o MOPS: Interpretation SummaryLeft ventricular size, wall motion and function are normal. The ejectionfraction is 55-60%.Global right ventricular function is normal.Moderate biatrial enlargement.There appears to be prosthetic material in the atrial septum, likely ASDrepair.There was no shunt at the atrial septal level as assessed by agitated salinebubble study at rest and with Valsalva maneuver.No hemodynamically significant valve abnormalities.The inferior vena cava is normal. There is no prior study for direct comparison.                  Objective    /76 (BP Location: Right arm, Patient Position: Sitting, Cuff Size: Adult Large)   Pulse 78   Temp 97.9  F (36.6  C) (Tympanic)   Resp 16   Ht 1.816 m (5' 11.5\")   Wt 134.6 kg (296 lb 12.8 oz)   SpO2 97%   BMI 40.82 kg/m    Body mass index is 40.82 kg/m .  Physical Exam attentive no acute distress vital signs are " stable he is afebrile  Lungs: Elevated  Cardiovascular rhythm  Lower extremities without edema at this time              Signed Electronically by: JIN Alfonso

## 2024-12-30 DIAGNOSIS — I50.21 ACUTE SYSTOLIC CONGESTIVE HEART FAILURE (H): ICD-10-CM

## 2024-12-30 RX ORDER — LISINOPRIL 5 MG/1
5 TABLET ORAL DAILY
Qty: 90 TABLET | Refills: 0 | OUTPATIENT
Start: 2024-12-30

## 2024-12-30 NOTE — TELEPHONE ENCOUNTER
Last seen 9/7/23 by CLL with recommendations to follow-up in 6 months. Noted per 9/25 refill encounter, refused as pt overdue to be seen and multiple attempts have been made by scheduling. Defer to PCP. LMS

## 2024-12-31 DIAGNOSIS — R06.02 SOB (SHORTNESS OF BREATH): ICD-10-CM

## 2024-12-31 DIAGNOSIS — I50.21 ACUTE SYSTOLIC CONGESTIVE HEART FAILURE (H): ICD-10-CM

## 2025-01-01 RX ORDER — FUROSEMIDE 20 MG/1
20 TABLET ORAL DAILY
Qty: 90 TABLET | Refills: 1 | Status: SHIPPED | OUTPATIENT
Start: 2025-01-01

## 2025-01-09 ENCOUNTER — MYC MEDICAL ADVICE (OUTPATIENT)
Dept: FAMILY MEDICINE | Facility: CLINIC | Age: 60
End: 2025-01-09
Payer: COMMERCIAL

## 2025-01-09 DIAGNOSIS — I50.21 ACUTE SYSTOLIC CONGESTIVE HEART FAILURE (H): ICD-10-CM

## 2025-01-09 RX ORDER — LISINOPRIL 5 MG/1
5 TABLET ORAL DAILY
Qty: 90 TABLET | Refills: 0 | Status: SHIPPED | OUTPATIENT
Start: 2025-01-09

## 2025-01-30 DIAGNOSIS — E66.1 CLASS 3 DRUG-INDUCED OBESITY WITH SERIOUS COMORBIDITY AND BODY MASS INDEX (BMI) OF 40.0 TO 44.9 IN ADULT (H): ICD-10-CM

## 2025-01-30 DIAGNOSIS — E66.813 CLASS 3 DRUG-INDUCED OBESITY WITH SERIOUS COMORBIDITY AND BODY MASS INDEX (BMI) OF 40.0 TO 44.9 IN ADULT (H): ICD-10-CM

## 2025-01-30 DIAGNOSIS — R73.02 IGT (IMPAIRED GLUCOSE TOLERANCE): ICD-10-CM

## 2025-01-30 RX ORDER — TIRZEPATIDE 2.5 MG/.5ML
INJECTION, SOLUTION SUBCUTANEOUS
Qty: 2 ML | Refills: 1 | Status: SHIPPED | OUTPATIENT
Start: 2025-01-30

## 2025-04-02 DIAGNOSIS — M1A.9XX0 CHRONIC GOUT INVOLVING TOE OF LEFT FOOT WITHOUT TOPHUS, UNSPECIFIED CAUSE: ICD-10-CM

## 2025-04-02 RX ORDER — ALLOPURINOL 100 MG/1
200 TABLET ORAL DAILY
Qty: 90 TABLET | Refills: 3 | Status: SHIPPED | OUTPATIENT
Start: 2025-04-02

## 2025-04-08 DIAGNOSIS — E66.1 CLASS 3 DRUG-INDUCED OBESITY WITH SERIOUS COMORBIDITY AND BODY MASS INDEX (BMI) OF 40.0 TO 44.9 IN ADULT (H): ICD-10-CM

## 2025-04-08 DIAGNOSIS — R73.02 IGT (IMPAIRED GLUCOSE TOLERANCE): ICD-10-CM

## 2025-04-08 DIAGNOSIS — E66.813 CLASS 3 DRUG-INDUCED OBESITY WITH SERIOUS COMORBIDITY AND BODY MASS INDEX (BMI) OF 40.0 TO 44.9 IN ADULT (H): ICD-10-CM

## 2025-04-08 RX ORDER — TIRZEPATIDE 5 MG/.5ML
INJECTION, SOLUTION SUBCUTANEOUS
Qty: 2 ML | Refills: 0 | Status: SHIPPED | OUTPATIENT
Start: 2025-04-08

## 2025-04-10 DIAGNOSIS — I50.21 ACUTE SYSTOLIC CONGESTIVE HEART FAILURE (H): ICD-10-CM

## 2025-04-10 RX ORDER — LISINOPRIL 5 MG/1
5 TABLET ORAL DAILY
Qty: 90 TABLET | Refills: 0 | Status: SHIPPED | OUTPATIENT
Start: 2025-04-10

## 2025-05-01 ENCOUNTER — MYC REFILL (OUTPATIENT)
Dept: FAMILY MEDICINE | Facility: CLINIC | Age: 60
End: 2025-05-01
Payer: COMMERCIAL

## 2025-05-01 DIAGNOSIS — E66.1 CLASS 3 DRUG-INDUCED OBESITY WITH SERIOUS COMORBIDITY AND BODY MASS INDEX (BMI) OF 40.0 TO 44.9 IN ADULT (H): ICD-10-CM

## 2025-05-01 DIAGNOSIS — E66.813 CLASS 3 DRUG-INDUCED OBESITY WITH SERIOUS COMORBIDITY AND BODY MASS INDEX (BMI) OF 40.0 TO 44.9 IN ADULT (H): ICD-10-CM

## 2025-05-01 DIAGNOSIS — R73.02 IGT (IMPAIRED GLUCOSE TOLERANCE): ICD-10-CM

## 2025-05-06 DIAGNOSIS — I50.21 ACUTE SYSTOLIC CONGESTIVE HEART FAILURE (H): ICD-10-CM

## 2025-05-06 DIAGNOSIS — F33.1 MODERATE RECURRENT MAJOR DEPRESSION (H): ICD-10-CM

## 2025-05-06 RX ORDER — CARVEDILOL 6.25 MG/1
6.25 TABLET ORAL 2 TIMES DAILY WITH MEALS
Qty: 60 TABLET | Refills: 5 | Status: SHIPPED | OUTPATIENT
Start: 2025-05-06

## 2025-05-07 NOTE — TELEPHONE ENCOUNTER
----- Message from Connie RUGGIERO Donald sent at 6/11/2021  9:20 AM CDT -----  Order: [id number]  Status:  Final result   Visible to patient:  No (inaccessible in Patient Portal)   Dx:  S/P AVR (aortic valve replacement) an...  0 Result Notes   Ref Range & Units 2 d ago  INR, Capillary <=4.5 1.6   Resulting Agency  [facility]      Specimen Collected: 06/09/21 10:01  Last Resulted: 06/09/21 10:03    Does not appear this has been addressed. Thank you       Routing refill request to provider for review/approval because:  Pt has not responded to outreach to confirm medication dose.   Zepbound 5mg was ordered 4/8/25  Pended zepbound 7.5mg

## 2025-06-02 ENCOUNTER — MYC REFILL (OUTPATIENT)
Dept: FAMILY MEDICINE | Facility: CLINIC | Age: 60
End: 2025-06-02
Payer: COMMERCIAL

## 2025-06-02 DIAGNOSIS — E66.813 CLASS 3 DRUG-INDUCED OBESITY WITH SERIOUS COMORBIDITY AND BODY MASS INDEX (BMI) OF 40.0 TO 44.9 IN ADULT (H): ICD-10-CM

## 2025-06-02 DIAGNOSIS — E66.1 CLASS 3 DRUG-INDUCED OBESITY WITH SERIOUS COMORBIDITY AND BODY MASS INDEX (BMI) OF 40.0 TO 44.9 IN ADULT (H): ICD-10-CM

## 2025-06-02 DIAGNOSIS — R73.02 IGT (IMPAIRED GLUCOSE TOLERANCE): ICD-10-CM

## 2025-06-18 ENCOUNTER — MYC MEDICAL ADVICE (OUTPATIENT)
Dept: FAMILY MEDICINE | Facility: CLINIC | Age: 60
End: 2025-06-18
Payer: COMMERCIAL

## 2025-06-18 DIAGNOSIS — E66.1 CLASS 3 DRUG-INDUCED OBESITY WITH SERIOUS COMORBIDITY AND BODY MASS INDEX (BMI) OF 40.0 TO 44.9 IN ADULT (H): Primary | ICD-10-CM

## 2025-06-18 DIAGNOSIS — E66.813 CLASS 3 DRUG-INDUCED OBESITY WITH SERIOUS COMORBIDITY AND BODY MASS INDEX (BMI) OF 40.0 TO 44.9 IN ADULT (H): Primary | ICD-10-CM

## 2025-06-29 DIAGNOSIS — E66.1 CLASS 3 DRUG-INDUCED OBESITY WITH SERIOUS COMORBIDITY AND BODY MASS INDEX (BMI) OF 40.0 TO 44.9 IN ADULT (H): ICD-10-CM

## 2025-06-29 DIAGNOSIS — E66.813 CLASS 3 DRUG-INDUCED OBESITY WITH SERIOUS COMORBIDITY AND BODY MASS INDEX (BMI) OF 40.0 TO 44.9 IN ADULT (H): ICD-10-CM

## 2025-06-29 DIAGNOSIS — R73.02 IGT (IMPAIRED GLUCOSE TOLERANCE): ICD-10-CM

## 2025-07-01 ENCOUNTER — TELEPHONE (OUTPATIENT)
Dept: FAMILY MEDICINE | Facility: CLINIC | Age: 60
End: 2025-07-01
Payer: COMMERCIAL

## 2025-07-01 DIAGNOSIS — R06.02 SOB (SHORTNESS OF BREATH): ICD-10-CM

## 2025-07-01 DIAGNOSIS — I50.21 ACUTE SYSTOLIC CONGESTIVE HEART FAILURE (H): ICD-10-CM

## 2025-07-01 RX ORDER — FUROSEMIDE 20 MG/1
20 TABLET ORAL DAILY
Qty: 90 TABLET | Refills: 1 | Status: SHIPPED | OUTPATIENT
Start: 2025-07-01

## 2025-07-01 RX ORDER — LISINOPRIL 5 MG/1
5 TABLET ORAL DAILY
Qty: 90 TABLET | Refills: 0 | Status: SHIPPED | OUTPATIENT
Start: 2025-07-01

## 2025-07-02 NOTE — TELEPHONE ENCOUNTER
PRIOR AUTHORIZATION DENIED    Medication: ZEPBOUND 7.5 MG/0.5ML SC SOAJ  Insurance Company: [employer] - Phone [phone] Fax [phone]  Denial Date: 7/2/2025  Denial Reason(s):     Appeal Information:     Patient Notified: NO

## 2025-07-02 NOTE — TELEPHONE ENCOUNTER
PA Initiation    Medication: ZEPBOUND 7.5 MG/0.5ML SC SOAJ  Insurance Company: [employer] - Phone [phone] Fax [phone]  [facility] the [id number] [place] [address] AT [facility] [address]  Filling Pharmacy Phone: [phone]  Filling Pharmacy Fax: [phone]  Start Date: 7/2/2025

## 2025-07-03 DIAGNOSIS — E66.813 CLASS 3 DRUG-INDUCED OBESITY WITH SERIOUS COMORBIDITY AND BODY MASS INDEX (BMI) OF 40.0 TO 44.9 IN ADULT (H): Primary | ICD-10-CM

## 2025-07-03 DIAGNOSIS — E66.1 CLASS 3 DRUG-INDUCED OBESITY WITH SERIOUS COMORBIDITY AND BODY MASS INDEX (BMI) OF 40.0 TO 44.9 IN ADULT (H): Primary | ICD-10-CM

## 2025-07-03 NOTE — TELEPHONE ENCOUNTER
Can we find out which are the covered drugs he needs to try first?  Thanks Brian   Continue Dexamethasone Nasal Spray, 

## 2025-07-14 ENCOUNTER — MYC REFILL (OUTPATIENT)
Dept: FAMILY MEDICINE | Facility: CLINIC | Age: 60
End: 2025-07-14
Payer: COMMERCIAL

## 2025-07-14 DIAGNOSIS — F33.1 MODERATE RECURRENT MAJOR DEPRESSION (H): ICD-10-CM

## 2025-08-08 ENCOUNTER — MYC REFILL (OUTPATIENT)
Dept: FAMILY MEDICINE | Facility: CLINIC | Age: 60
End: 2025-08-08
Payer: COMMERCIAL

## 2025-08-08 DIAGNOSIS — E66.1 CLASS 3 DRUG-INDUCED OBESITY WITH SERIOUS COMORBIDITY AND BODY MASS INDEX (BMI) OF 40.0 TO 44.9 IN ADULT (H): ICD-10-CM

## 2025-08-08 DIAGNOSIS — E66.813 CLASS 3 DRUG-INDUCED OBESITY WITH SERIOUS COMORBIDITY AND BODY MASS INDEX (BMI) OF 40.0 TO 44.9 IN ADULT (H): ICD-10-CM

## 2025-08-10 ENCOUNTER — HEALTH MAINTENANCE LETTER (OUTPATIENT)
Age: 60
End: 2025-08-10

## 2025-08-25 ENCOUNTER — OFFICE VISIT (OUTPATIENT)
Dept: CARDIOLOGY | Facility: CLINIC | Age: 60
End: 2025-08-25
Payer: COMMERCIAL

## 2025-08-25 VITALS
OXYGEN SATURATION: 98 % | HEART RATE: 81 BPM | DIASTOLIC BLOOD PRESSURE: 70 MMHG | WEIGHT: 280.6 LBS | SYSTOLIC BLOOD PRESSURE: 113 MMHG | HEIGHT: 71 IN | BODY MASS INDEX: 39.28 KG/M2 | RESPIRATION RATE: 16 BRPM

## 2025-08-25 DIAGNOSIS — E78.5 DYSLIPIDEMIA: ICD-10-CM

## 2025-08-25 DIAGNOSIS — I25.10 CORONARY ARTERY DISEASE INVOLVING NATIVE CORONARY ARTERY OF NATIVE HEART WITHOUT ANGINA PECTORIS: ICD-10-CM

## 2025-08-25 PROCEDURE — G2211 COMPLEX E/M VISIT ADD ON: HCPCS | Performed by: INTERNAL MEDICINE

## 2025-08-25 PROCEDURE — 3074F SYST BP LT 130 MM HG: CPT | Performed by: INTERNAL MEDICINE

## 2025-08-25 PROCEDURE — 99214 OFFICE O/P EST MOD 30 MIN: CPT | Performed by: INTERNAL MEDICINE

## 2025-08-25 PROCEDURE — 3078F DIAST BP <80 MM HG: CPT | Performed by: INTERNAL MEDICINE

## 2025-08-25 RX ORDER — ROSUVASTATIN CALCIUM 10 MG/1
10 TABLET, COATED ORAL DAILY
Qty: 90 TABLET | Refills: 3 | Status: SHIPPED | OUTPATIENT
Start: 2025-08-25

## 2025-08-25 RX ORDER — ASPIRIN 81 MG/1
81 TABLET ORAL DAILY
Status: SHIPPED
Start: 2025-08-25

## 2025-09-04 ENCOUNTER — MYC REFILL (OUTPATIENT)
Dept: FAMILY MEDICINE | Facility: CLINIC | Age: 60
End: 2025-09-04
Payer: COMMERCIAL

## 2025-09-04 DIAGNOSIS — I50.21 ACUTE SYSTOLIC CONGESTIVE HEART FAILURE (H): ICD-10-CM

## 2025-09-04 DIAGNOSIS — R06.02 SOB (SHORTNESS OF BREATH): ICD-10-CM

## 2025-09-04 RX ORDER — CARVEDILOL 6.25 MG/1
6.25 TABLET ORAL 2 TIMES DAILY WITH MEALS
Qty: 60 TABLET | Refills: 5 | OUTPATIENT
Start: 2025-09-04

## 2025-09-04 RX ORDER — FUROSEMIDE 20 MG/1
20 TABLET ORAL DAILY
Qty: 90 TABLET | Refills: 1 | OUTPATIENT
Start: 2025-09-04